# Patient Record
Sex: FEMALE | Race: OTHER | HISPANIC OR LATINO | Employment: FULL TIME | URBAN - METROPOLITAN AREA
[De-identification: names, ages, dates, MRNs, and addresses within clinical notes are randomized per-mention and may not be internally consistent; named-entity substitution may affect disease eponyms.]

---

## 2017-01-25 RX ORDER — LEVOTHYROXINE SODIUM 0.05 MG/1
TABLET ORAL
Qty: 90 TAB | Refills: 1 | Status: SHIPPED | OUTPATIENT
Start: 2017-01-25 | End: 2017-10-13

## 2017-06-26 ENCOUNTER — OFFICE VISIT (OUTPATIENT)
Dept: MEDICAL GROUP | Facility: MEDICAL CENTER | Age: 42
End: 2017-06-26
Payer: COMMERCIAL

## 2017-06-26 VITALS
SYSTOLIC BLOOD PRESSURE: 96 MMHG | TEMPERATURE: 99.2 F | OXYGEN SATURATION: 96 % | HEART RATE: 72 BPM | DIASTOLIC BLOOD PRESSURE: 66 MMHG | WEIGHT: 140.7 LBS | BODY MASS INDEX: 24.93 KG/M2 | HEIGHT: 63 IN

## 2017-06-26 DIAGNOSIS — M26.629 TMJ PAIN DYSFUNCTION SYNDROME: ICD-10-CM

## 2017-06-26 DIAGNOSIS — Z12.31 ENCOUNTER FOR SCREENING MAMMOGRAM FOR MALIGNANT NEOPLASM OF BREAST: ICD-10-CM

## 2017-06-26 PROCEDURE — 99214 OFFICE O/P EST MOD 30 MIN: CPT | Performed by: FAMILY MEDICINE

## 2017-06-26 ASSESSMENT — PATIENT HEALTH QUESTIONNAIRE - PHQ9: CLINICAL INTERPRETATION OF PHQ2 SCORE: 0

## 2017-06-26 NOTE — PROGRESS NOTES
Subjective:     Chief Complaint   Patient presents with   • Facial Swelling     (L) side   • Ear Pain     (L) ear       History of Present Illness:  Koki Arguello is a 41 y.o. female established patient who presents today to have evaluation of left-sided facial pain:    TMJ pain dysfunction syndrome  Patient presents for one to 2 week history of left-sided facial pain and ear pain. Patient states pain originates in the left V1 distribution, and radiates down towards the left V3 distribution. Patient acknowledges grinding her teeth at night, does not use a .    Patient has recently seen her dentist, approximately one week ago, who did not find any cavities or other dental pathologies at that time.    ROS is NEGATIVE for confusion, altered mentation, word finding difficulty, memory loss, facial droop, dysarthria, dysphagia, hemiplegia, gait instability.      Patient Active Problem List    Diagnosis Date Noted   • TMJ pain dysfunction syndrome 06/26/2017   • GERD (gastroesophageal reflux disease) 01/13/2016   • Allergic rhinitis 02/06/2013   • Tinnitus 11/08/2010   • Proteinuria 03/24/2010   • Hypothyroidism 03/19/2010   • Preventative health care 02/19/2010   • Scoliosis    • Family history of breast cancer in mother 01/06/2010       Additional History:   Allergies:    Nkda     Current Medications:     Current Outpatient Prescriptions   Medication Sig Dispense Refill   • levothyroxine (SYNTHROID) 50 MCG Tab TAKE ONE TABLET BY MOUTH EVERY MORNING ON AN EMPTY STOMACH 90 Tab 1   • levothyroxine (SYNTHROID) 50 MCG Tab Take 1 Tab by mouth Every morning on an empty stomach. 30 Tab 3   • levothyroxine (SYNTHROID) 25 MCG Tab Take 1 Tab by mouth every day. 90 Tab 1   • Multiple Vitamins-Minerals (MULTIVITAL) TABS Take 1 Tab by mouth every day.     • Cholecalciferol (VITAMIN D) 1000 UNIT CAPS Take 2 Caps by mouth every day.       No current facility-administered medications for this visit.        Social  "History:     Social History   Substance Use Topics   • Smoking status: Never Smoker    • Smokeless tobacco: Never Used   • Alcohol Use: 0.0 oz/week     0 Standard drinks or equivalent per week      Comment: occasional       ROS:     - NOTE: All other systems reviewed and are negative, except as in HPI.     Objective:   Physical Exam:    Vitals: Blood pressure 96/66, pulse 72, temperature 37.3 °C (99.2 °F), height 1.6 m (5' 2.99\"), weight 63.821 kg (140 lb 11.2 oz), SpO2 96 %, not currently breastfeeding.   BMI: Body mass index is 24.93 kg/(m^2).   General/Constitutional: Vitals as above, Well nourished, well developed female in no acute distress   Head/Eyes: Head is grossly normal & atraumatic, bilateral conjunctivae not injected, bilateral EOMI, bilateral PERRL   ENT: Bilateral external ears grossly normal in appearance, Hearing grossly intact, bilateral external canals without cerumen impaction, bilateral TMs are able to the visualized without erythema/bulging/exudates, External nares normal in appearance and without discharge/bleeding, bilateral frontal/maxillary sinuses non tender to palpation, posterior oropharynx without erythema/exudates and without airway obstruction, left TMJ pain upon opening and closing with direct pressure or with opening and closing with tympanoscope present in left external canal   Respiratory: No respiratory distress, bilateral lungs are clear to ausculation in all lung fields (anterior/lateral/posterior), no wheezing/rhonchi/rales   Cardiovascular: Regular rate and rhythm without murmur/gallops/rubs, distal pulses are intact and equal bilaterally (radial, posterior tibial), no bilateral lower extremity edema   MSK: Gait grossly normal & not antalgic   Integumentary: No apparent rashes   Psych: Judgment grossly appropriate, no apparent depression/anxiety    Assessment and Plan:   1. TMJ pain dysfunction syndrome  Unstable, controlled. Patient instructed to use  as well as " take NSAIDs. Patient verbalizes understanding.    2. Encounter for screening mammogram for malignant neoplasm of breast  Patient due for mammogram due to [facility] of breast pathology (mother). Ordered as below.    - MA-SCREEN MAMMO W/CAD-BILAT      RTC: Patient to follow-up with PCP for annual medical exam.    PLEASE NOTE: This dictation was created using voice recognition software. I have made every reasonable attempt to correct obvious errors, but I expect that there are errors of grammar and possibly content that I did not discover before finalizing the note.

## 2017-06-26 NOTE — PATIENT INSTRUCTIONS
For the next 2 weeks, do the followin) Wear a bite guard at night (can buy a new one at a [employer] store)     2) Use Ibuprofen 400 or 600mg up to every 6 hours as needed.  At least take this at night, and first thing in the morning.

## 2017-06-26 NOTE — ASSESSMENT & PLAN NOTE
Patient presents for one to 2 week history of left-sided facial pain and ear pain. Patient states pain originates in the left V1 distribution, and radiates down towards the left V3 distribution. Patient acknowledges grinding her teeth at night, does not use a .    Patient has recently seen her dentist, approximately one week ago, who did not find any cavities or other dental pathologies at that time.    ROS is NEGATIVE for confusion, altered mentation, word finding difficulty, memory loss, facial droop, dysarthria, dysphagia, hemiplegia, gait instability.

## 2017-06-26 NOTE — MR AVS SNAPSHOT
"Koki Arguello   2017 10:40 AM   Office Visit   MRN: [id number]    Department:  [place] [address]   Dept Phone:  [phone]    Description:  Female : 1975   Provider:  Hal Leyva M.D.           Reason for Visit     Facial Swelling (L) side    Ear Pain (L) ear      Allergies as of 2017     Allergen Noted Reactions    Nkda [No Known Drug Allergy] 2010         You were diagnosed with     TMJ pain dysfunction syndrome   [156468]       Encounter for screening mammogram for malignant neoplasm of breast   [[id number]]         Vital Signs     Blood Pressure Pulse Temperature Height Weight Body Mass Index    96/66 mmHg 72 37.3 °C (99.2 °F) 1.6 m (5' 2.99\") 63.821 kg (140 lb 11.2 oz) 24.93 kg/m2    Oxygen Saturation Breastfeeding? Smoking Status             96% No Never Smoker          Basic Information     Date Of Birth Sex Race Ethnicity Preferred Language    1975 Female White Non- English      Problem List              ICD-10-CM Priority Class Noted - Resolved    Family history of breast cancer in mother (Chronic) Z80.3   2010 - Present    Preventative health care Z00.00   2010 - Present    Scoliosis M41.9   Unknown - Present    Hypothyroidism E03.9   3/19/2010 - Present    Proteinuria R80.9   3/24/2010 - Present    Tinnitus    2010 - Present    Allergic rhinitis    2013 - Present    GERD (gastroesophageal reflux disease) K21.9   2016 - Present    TMJ pain dysfunction syndrome M26.629   2017 - Present      Health Maintenance        Date Due Completion Dates    IMM DTaP/Tdap/Td Vaccine (1 - Tdap) 1993 ---    PAP SMEAR 2016, 2011, 2010    MAMMOGRAM 2017, 4/15/2014, 2013, 2011, 3/2/2010, 3/2/2010, 2009, 2009, 3/14/2007, 3/14/2007            Current Immunizations     Hepatitis B Vaccine Recombivax (Adol/Adult) 2015  4:57 PM    Influenza TIV (IM) 2013, 2013   " Influenza Vaccine Quad Inj (Pf) 11/5/2016  8:59 AM, 11/7/2015  9:49 AM    Tuberculin Skin Test 10/20/2015  4:27 PM      Below and/or attached are the medications your provider expects you to take. Review all of your home medications and newly ordered medications with your provider and/or pharmacist. Follow medication instructions as directed by your provider and/or pharmacist. Please keep your medication list with you and share with your provider. Update the information when medications are discontinued, doses are changed, or new medications (including over-the-counter products) are added; and carry medication information at all times in the event of emergency situations     Allergies:  NKDA - (reactions not documented)               Medications  Valid as of: June 26, 2017 - 11:06 AM    Generic Name Brand Name Tablet Size Instructions for use    Cholecalciferol (Cap) Vitamin D 1000 UNIT Take 2 Caps by mouth every day.        Levothyroxine Sodium (Tab) SYNTHROID 25 MCG Take 1 Tab by mouth every day.        Levothyroxine Sodium (Tab) SYNTHROID 50 MCG Take 1 Tab by mouth Every morning on an empty stomach.        Levothyroxine Sodium (Tab) SYNTHROID 50 MCG TAKE ONE TABLET BY MOUTH EVERY MORNING ON AN EMPTY STOMACH        Multiple Vitamins-Minerals (Tab) MULTIVITAL  Take 1 Tab by mouth every day.        .                 Medicines prescribed today were sent to:     [facility][id number] - [place] - [address]    [address] [address]    Phone: [phone] Fax: [phone]    Open 24 Hours?: No    [id number] - [place] - [address]    [address] [facility] [address]    Phone: [phone] Fax: [phone]    Open 24 Hours?: No      Medication refill instructions:       If your prescription bottle indicates you have medication refills left, it is not necessary to call your provider’s office. Please contact your pharmacy and they will refill your medication.      If your prescription bottle indicates you do not have any refills left, you may request refills at any time through one of the following ways: The online [employer] system (except Urgent Care), by calling your provider’s office, or by asking your pharmacy to contact your provider’s office with a refill request. Medication refills are processed only during regular business hours and may not be available until the next business day. Your provider may request additional information or to have a follow-up visit with you prior to refilling your medication.   *Please Note: Medication refills are assigned a new Rx number when refilled electronically. Your pharmacy may indicate that no refills were authorized even though a new prescription for the same medication is available at the pharmacy. Please request the medicine by name with the pharmacy before contacting your provider for a refill.        Instructions    For the next 2 weeks, do the followin) Wear a bite guard at night (can buy a new one at a sports' store)     2) Use Ibuprofen 400 or 600mg up to every 6 hours as needed.  At least take this at night, and first thing in the morning.              [employer] Access Code: Activation code not generated  Current [employer] Status: Active

## 2017-09-26 ENCOUNTER — HOSPITAL ENCOUNTER (OUTPATIENT)
Facility: MEDICAL CENTER | Age: 42
End: 2017-09-26
Payer: COMMERCIAL

## 2017-09-27 ENCOUNTER — APPOINTMENT (OUTPATIENT)
Dept: RADIOLOGY | Facility: MEDICAL CENTER | Age: 42
End: 2017-09-27
Attending: FAMILY MEDICINE
Payer: COMMERCIAL

## 2017-09-27 LAB
ALBUMIN SERPL BCP-MCNC: 4.4 G/DL (ref 3.2–4.9)
ALBUMIN/GLOB SERPL: 1.3 G/DL
ALP SERPL-CCNC: 57 U/L (ref 30–99)
ALT SERPL-CCNC: 16 U/L (ref 2–50)
ANION GAP SERPL CALC-SCNC: 9 MMOL/L (ref 0–11.9)
AST SERPL-CCNC: 19 U/L (ref 12–45)
BDY FAT % MEASURED: 33.5 %
BILIRUB SERPL-MCNC: 0.9 MG/DL (ref 0.1–1.5)
BP DIAS: 64 MMHG
BP SYS: 98 MMHG
BUN SERPL-MCNC: 10 MG/DL (ref 8–22)
CALCIUM SERPL-MCNC: 9.6 MG/DL (ref 8.5–10.5)
CHLORIDE SERPL-SCNC: 105 MMOL/L (ref 96–112)
CHOLEST SERPL-MCNC: 175 MG/DL (ref 100–199)
CO2 SERPL-SCNC: 24 MMOL/L (ref 20–33)
CREAT SERPL-MCNC: 0.78 MG/DL (ref 0.5–1.4)
DIABETES HTDIA: NO
EVENT NAME HTEVT: NORMAL
GFR SERPL CREATININE-BSD FRML MDRD: >60 ML/MIN/1.73 M 2
GLOBULIN SER CALC-MCNC: 3.4 G/DL (ref 1.9–3.5)
GLUCOSE SERPL-MCNC: 83 MG/DL (ref 65–99)
HDLC SERPL-MCNC: 77 MG/DL
HYPERTENSION HTHYP: NO
LDLC SERPL CALC-MCNC: 78 MG/DL
POTASSIUM SERPL-SCNC: 4 MMOL/L (ref 3.6–5.5)
PROT SERPL-MCNC: 7.8 G/DL (ref 6–8.2)
SCREENING LOC CITY HTCIT: NORMAL
SCREENING LOC STATE HTSTA: NORMAL
SCREENING LOCATION HTLOC: NORMAL
SODIUM SERPL-SCNC: 138 MMOL/L (ref 135–145)
SUBSCRIBER ID HTSID: NORMAL
TRIGL SERPL-MCNC: 100 MG/DL (ref 0–149)

## 2017-10-04 ENCOUNTER — OFFICE VISIT (OUTPATIENT)
Dept: MEDICAL GROUP | Facility: MEDICAL CENTER | Age: 42
End: 2017-10-04
Payer: COMMERCIAL

## 2017-10-04 VITALS
RESPIRATION RATE: 16 BRPM | HEART RATE: 72 BPM | OXYGEN SATURATION: 99 % | WEIGHT: 138 LBS | BODY MASS INDEX: 24.45 KG/M2 | HEIGHT: 63 IN | DIASTOLIC BLOOD PRESSURE: 70 MMHG | SYSTOLIC BLOOD PRESSURE: 108 MMHG | TEMPERATURE: 98.2 F

## 2017-10-04 DIAGNOSIS — J01.00 ACUTE NON-RECURRENT MAXILLARY SINUSITIS: ICD-10-CM

## 2017-10-04 PROBLEM — J06.9 VIRAL UPPER RESPIRATORY TRACT INFECTION: Status: ACTIVE | Noted: 2017-10-04

## 2017-10-04 PROCEDURE — 99214 OFFICE O/P EST MOD 30 MIN: CPT | Performed by: FAMILY MEDICINE

## 2017-10-04 RX ORDER — AMOXICILLIN 500 MG/1
1000 CAPSULE ORAL 2 TIMES DAILY
Qty: 40 CAP | Refills: 0 | Status: SHIPPED | OUTPATIENT
Start: 2017-10-04 | End: 2017-10-13

## 2017-10-04 NOTE — PROGRESS NOTES
Subjective:     Chief Complaint   Patient presents with   • Neck Pain     right side   • Headache     pain radiated from left temple to jaw       Koki Arguello is a 41 y.o. female here today for evaluation and management of:    Acute non-recurrent maxillary sinusitis  Illness: 5 days of illness including: nasal congestion, clear/whitish rhinorrhea, ear pain/congestion, facial pain, bilateral, cough, myalgias ,  Sinus pain and pressure: left maxillary  Symptoms negative for sore throat,   Treatments tried: Advil   Since onset, symptoms got better but then developed another fever and worse ear pain and left sinus pain  Similarly ill exposures: yes  Medical history negative for asthma  She  reports that she has never smoked. She has never used smokeless tobacco.         Allergies   Allergen Reactions   • Nkda [No Known Drug Allergy]        Current medicines (including changes today)  Current Outpatient Prescriptions   Medication Sig Dispense Refill   • amoxicillin (AMOXIL) 500 MG Cap Take 2 Caps by mouth 2 times a day. 40 Cap 0   • levothyroxine (SYNTHROID) 50 MCG Tab Take 1 Tab by mouth Every morning on an empty stomach. 30 Tab 3   • Multiple Vitamins-Minerals (MULTIVITAL) TABS Take 1 Tab by mouth every day.     • Cholecalciferol (VITAMIN D) 1000 UNIT CAPS Take 2 Caps by mouth every day.     • levothyroxine (SYNTHROID) 50 MCG Tab TAKE ONE TABLET BY MOUTH EVERY MORNING ON AN EMPTY STOMACH 90 Tab 1   • levothyroxine (SYNTHROID) 25 MCG Tab Take 1 Tab by mouth every day. 90 Tab 1     No current facility-administered medications for this visit.        She  has a past medical history of Allergy; Family history of breast cancer in mother; GERD (gastroesophageal reflux disease); Hypothyroidism; Proteinuria; Scoliosis; and Tinnitus.    Patient Active Problem List    Diagnosis Date Noted   • Acute non-recurrent maxillary sinusitis 10/04/2017   • TMJ pain dysfunction syndrome 06/26/2017   • GERD (gastroesophageal reflux  "disease) 01/13/2016   • Allergic rhinitis 02/06/2013   • Tinnitus 11/08/2010   • Proteinuria 03/24/2010   • Hypothyroidism 03/19/2010   • Preventative health care 02/19/2010   • Scoliosis    • Family history of breast cancer in mother 01/06/2010       ROS .    No nausea or vomiting.  No chest pain or palpitations.  No SOB.  No pain with urination or hematuria.  No black or bloody stools.       Objective:     Blood pressure 108/70, pulse 72, temperature 36.8 °C (98.2 °F), resp. rate 16, height 1.6 m (5' 3\"), weight 62.6 kg (138 lb), SpO2 99 %. Body mass index is 24.45 kg/m².   Physical Exam:  Well developed, well nourished.  Alert, oriented in no acute distress.  Eye contact is good, speech goal directed, affect calm  Eyes: conjunctiva non-injected, sclera non-icteric.  Ears: Pinna normal. TM With clear fluid bilaterally  Nose: Nares are patent. Erythematous, swollen mucosa with yellow discharge  Mouth: Oral mucous membranes pink and moist with no lesions. Mild diffuse posterior pharynx erythema with yellow postnasal drainage  Sinuses tender to percussion bilateral frontal and left maxillary  Neck Supple.  No adenopathy or masses in the neck or supraclavicular regions. No thyromegaly  Lungs: clear to auscultation bilaterally with good excursion. No wheezes or rhonchi  CV: regular rate and rhythm. No murmur        Assessment and Plan:   The following treatment plan was discussed    1. Acute non-recurrent maxillary sinusitis  Discussed with patient that this probably started as a viral infection and she now has secondary bacterial infection. We'll start her on antibiotics amoxicillin for 10 days. Increase fluids and rest. Call if not improving.  - amoxicillin (AMOXIL) 500 MG Cap; Take 2 Caps by mouth 2 times a day.  Dispense: 40 Cap; Refill: 0    Any change or worsening of signs or symptoms, patient encouraged to follow-up or report to the emergency room for further evaluation. Patient understands and " agrees.    Followup: Return if symptoms worsen or fail to improve.

## 2017-10-04 NOTE — PATIENT INSTRUCTIONS

## 2017-10-04 NOTE — ASSESSMENT & PLAN NOTE
Illness: 5 days of illness including: nasal congestion, clear/whitish rhinorrhea, ear pain/congestion, facial pain, bilateral, cough, myalgias ,  Sinus pain and pressure: left maxillary  Symptoms negative for sore throat,   Treatments tried: Advil   Since onset, symptoms got better but then developed another fever and worse ear pain and left sinus pain  Similarly ill exposures: yes  Medical history negative for asthma  She  reports that she has never smoked. She has never used smokeless tobacco.

## 2017-10-13 ENCOUNTER — OFFICE VISIT (OUTPATIENT)
Dept: MEDICAL GROUP | Facility: MEDICAL CENTER | Age: 42
End: 2017-10-13
Payer: COMMERCIAL

## 2017-10-13 VITALS
WEIGHT: 139.2 LBS | HEART RATE: 76 BPM | BODY MASS INDEX: 24.66 KG/M2 | TEMPERATURE: 97.4 F | RESPIRATION RATE: 16 BRPM | OXYGEN SATURATION: 100 % | SYSTOLIC BLOOD PRESSURE: 106 MMHG | DIASTOLIC BLOOD PRESSURE: 70 MMHG | HEIGHT: 63 IN

## 2017-10-13 DIAGNOSIS — G50.0 TRIGEMINAL NEURALGIA OF LEFT SIDE OF FACE: ICD-10-CM

## 2017-10-13 PROBLEM — R68.84 JAW PAIN: Status: ACTIVE | Noted: 2017-10-13

## 2017-10-13 PROBLEM — J01.00 ACUTE NON-RECURRENT MAXILLARY SINUSITIS: Status: RESOLVED | Noted: 2017-10-04 | Resolved: 2017-10-13

## 2017-10-13 PROCEDURE — 99214 OFFICE O/P EST MOD 30 MIN: CPT | Performed by: PHYSICIAN ASSISTANT

## 2017-10-13 RX ORDER — BACLOFEN 20 MG/1
20 TABLET ORAL 3 TIMES DAILY
Qty: 60 TAB | Refills: 0 | Status: SHIPPED | OUTPATIENT
Start: 2017-10-13 | End: 2020-07-23

## 2017-10-13 NOTE — PROGRESS NOTES
Subjective:   Koki Arguello is a 41 y.o. female here today for shooting pain down the left side of her face for 2 weeks.    Trigeminal neuralgia of left side of face  This is a 41-year-old female complains of a two-week history of pain that starts in her left temporal area radiating down through the jaw in the lower aspect and around to the front other lower jaw over teeth. She states over the two-week symptoms got worse. Pain is like an electrical shock like lightening to last for a couple seconds. She has seen a PCP down stairs also went to dentistry. Initially dentistry did x-rays did not believe that there were any cause for concern because x-rays were negative. He did not believe it was TMJ. PCP down stairs provided an antibiotic which has not provided any effectiveness. Back in June she had a diagnosis of TMJ by Dr. Leyva. At that time the pain was a lot different. Pain was more in the left upper jaw area. She's had TMJ in the past but this is different. She wears a . She been taking ibuprofen regularly without any effectiveness. She states that the other night her  gave her a muscle relaxer, doesn't know which one, and it helped her sleep at night. She states she's been taking the antibiotic but it has been causing side effects and she wants to stop it if she could.       Current medicines (including changes today)  Current Outpatient Prescriptions   Medication Sig Dispense Refill   • baclofen (LIORESAL) 20 MG tablet Take 1 Tab by mouth 3 times a day. 60 Tab 0   • levothyroxine (SYNTHROID) 50 MCG Tab Take 1 Tab by mouth Every morning on an empty stomach. 30 Tab 3   • levothyroxine (SYNTHROID) 25 MCG Tab Take 1 Tab by mouth every day. 90 Tab 1   • Multiple Vitamins-Minerals (MULTIVITAL) TABS Take 1 Tab by mouth every day.     • Cholecalciferol (VITAMIN D) 1000 UNIT CAPS Take 2 Caps by mouth every day.       No current facility-administered medications for this visit.      She  has a past  "medical history of Allergy; Family history of breast cancer in mother; GERD (gastroesophageal reflux disease); Hypothyroidism; Proteinuria; Scoliosis; and Tinnitus.    ROS   No chest pain, no shortness of breath, no abdominal pain and all other systems were reviewed and are negative.       Objective:     Blood pressure 106/70, pulse 76, temperature 36.3 °C (97.4 °F), resp. rate 16, height 1.6 m (5' 3\"), weight 63.1 kg (139 lb 3.2 oz), SpO2 100 %. Body mass index is 24.66 kg/m².   Physical Exam:  Constitutional: Alert, no distress.  Skin: Warm, dry, good turgor, no rashes in visible areas.  Eye: Equal, round and reactive, conjunctiva clear, lids normal.  ENMT: Lips without lesions, good dentition without significant wearing of her teeth, oropharynx clear.TMs bilaterally are clear.  Neck: Trachea midline, no masses.   Lymph: No cervical or supraclavicular lymphadenopathy  Respiratory: Unlabored respiratory effort, lungs clear to auscultation, no wheezes, no ronchi.  Cardiovascular: Normal S1, S2, no murmur, no edema.  Neuro: CN II through XII intact.  Musculoskeletal: Point tenderness over left TMJ.  Psych: Alert and oriented x3, normal affect and mood.        Assessment and Plan:   The following treatment plan was discussed    1. Trigeminal neuralgia of left side of face  Acute, new onset condition. Symptoms began around the left temple radiating down the face. Suspicious for trigeminal neuralgia and not TMJ although TMJ factors may complicate picture. Referred to neurology. Also provided baclofen as it may be effective may take 20 or 10 mg 3 times a day depending upon it causes drowsiness. May stop antibiotic. Contact me or return with any continuation of worsening symptoms. Will defer any imaging to neurology.  - baclofen (LIORESAL) 20 MG tablet; Take 1 Tab by mouth 3 times a day.  Dispense: 60 Tab; Refill: 0  - REFERRAL TO NEUROLOGY      Followup: Return if symptoms worsen or fail to improve.    Please note that " this dictation was created using voice recognition software. I have made every reasonable attempt to correct obvious errors, but I expect that there are errors of grammar and possibly content that I did not discover before finalizing the note.

## 2017-10-13 NOTE — ASSESSMENT & PLAN NOTE
This is a 41-year-old female complains of a two-week history of pain that starts in her left temporal area radiating down through the jaw in the lower aspect and around to the front other lower jaw over teeth. She states over the two-week symptoms got worse. Pain is like an electrical shock like lightening to last for a couple seconds. She has seen a PCP down stairs also went to dentistry. Initially dentistry did x-rays did not believe that there were any cause for concern because x-rays were negative. He did not believe it was TMJ. PCP down stairs provided an antibiotic which has not provided any effectiveness. Back in June she had a diagnosis of TMJ by Dr. Leyva. At that time the pain was a lot different. Pain was more in the left upper jaw area. She's had TMJ in the past but this is different. She wears a . She been taking ibuprofen regularly without any effectiveness. She states that the other night her  gave her a muscle relaxer, doesn't know which one, and it helped her sleep at night. She states she's been taking the antibiotic but it has been causing side effects and she wants to stop it if she could.

## 2017-10-25 ENCOUNTER — HOSPITAL ENCOUNTER (OUTPATIENT)
Dept: RADIOLOGY | Facility: MEDICAL CENTER | Age: 42
End: 2017-10-25
Attending: FAMILY MEDICINE
Payer: COMMERCIAL

## 2017-10-25 ENCOUNTER — HOSPITAL ENCOUNTER (OUTPATIENT)
Facility: MEDICAL CENTER | Age: 42
End: 2017-10-25
Attending: NURSE PRACTITIONER
Payer: COMMERCIAL

## 2017-10-25 ENCOUNTER — OFFICE VISIT (OUTPATIENT)
Dept: MEDICAL GROUP | Facility: MEDICAL CENTER | Age: 42
End: 2017-10-25
Payer: COMMERCIAL

## 2017-10-25 VITALS
SYSTOLIC BLOOD PRESSURE: 118 MMHG | RESPIRATION RATE: 16 BRPM | OXYGEN SATURATION: 98 % | BODY MASS INDEX: 24.59 KG/M2 | HEIGHT: 63 IN | WEIGHT: 138.8 LBS | HEART RATE: 82 BPM | DIASTOLIC BLOOD PRESSURE: 60 MMHG | TEMPERATURE: 98.4 F

## 2017-10-25 DIAGNOSIS — Z12.31 ENCOUNTER FOR SCREENING MAMMOGRAM FOR MALIGNANT NEOPLASM OF BREAST: ICD-10-CM

## 2017-10-25 DIAGNOSIS — G50.0 TRIGEMINAL NEURALGIA: ICD-10-CM

## 2017-10-25 DIAGNOSIS — Z12.4 ROUTINE CERVICAL SMEAR: ICD-10-CM

## 2017-10-25 DIAGNOSIS — N63.0 BREAST NODULE: ICD-10-CM

## 2017-10-25 DIAGNOSIS — Z01.419 PAP SMEAR, LOW-RISK: ICD-10-CM

## 2017-10-25 DIAGNOSIS — E03.9 ACQUIRED HYPOTHYROIDISM: ICD-10-CM

## 2017-10-25 DIAGNOSIS — E55.9 VITAMIN D DEFICIENCY: ICD-10-CM

## 2017-10-25 PROCEDURE — 87624 HPV HI-RISK TYP POOLED RSLT: CPT

## 2017-10-25 PROCEDURE — G0202 SCR MAMMO BI INCL CAD: HCPCS

## 2017-10-25 PROCEDURE — 88175 CYTOPATH C/V AUTO FLUID REDO: CPT

## 2017-10-25 PROCEDURE — 99396 PREV VISIT EST AGE 40-64: CPT | Performed by: NURSE PRACTITIONER

## 2017-10-25 RX ORDER — LEVOTHYROXINE SODIUM 0.05 MG/1
50 TABLET ORAL
Qty: 90 TAB | Refills: 0 | Status: SHIPPED | OUTPATIENT
Start: 2017-10-25 | End: 2018-02-11 | Stop reason: SDUPTHER

## 2017-10-25 NOTE — PROGRESS NOTES
Subjective:      Koki Arguello is a 41 y.o. female who presents with Gynecologic Exam            HPI  Seen in f/u for pap smear.  She has been seen recently for left severe facial pain.  She was dx initially with sinus infection.  Then saw someone else for TMJ pain.  Then saw a 3 rd provider and dx with trigiminal neuralgia.  She also went to a dentist.  All was ok.  She has taken antibx but had s/e.  She completed it anyway.  She is now only occas using muscle relaxer.  The pain is getting better.  She also has a appt with neuro for eval.    She thinks that she had a previous episode in june but the pain was less.  She is now having left top of head pain episodes that will last up to 10 min.  No  Temperature sensation.   She has her lab done for biometrics.  CMP, GFR, LP is wnl.  Reviewed all withpt.   She has done research and learned that sometimes trigiminal neuralgia is d/t low d or b12.  Her d has been low last year.  She is on otc d.    She is on synthroid.  She is due updated lab.  Stable on meds.      Patient Active Problem List    Diagnosis Date Noted   • Trigeminal neuralgia of left side of face 10/13/2017   • TMJ pain dysfunction syndrome 06/26/2017   • GERD (gastroesophageal reflux disease) 01/13/2016   • Allergic rhinitis 02/06/2013   • Tinnitus 11/08/2010   • Proteinuria 03/24/2010   • Hypothyroidism 03/19/2010   • Preventative health care 02/19/2010   • Scoliosis    • Family history of breast cancer in mother 01/06/2010     Current Outpatient Prescriptions   Medication Sig Dispense Refill   • baclofen (LIORESAL) 20 MG tablet Take 1 Tab by mouth 3 times a day. 60 Tab 0   • levothyroxine (SYNTHROID) 50 MCG Tab Take 1 Tab by mouth Every morning on an empty stomach. 30 Tab 3   • Multiple Vitamins-Minerals (MULTIVITAL) TABS Take 1 Tab by mouth every day.     • Cholecalciferol (VITAMIN D) 1000 UNIT CAPS Take 2 Caps by mouth every day.       No current facility-administered medications for this visit.   "    Allergies   Allergen Reactions   • Amoxicillin Itching and Unspecified     Tight bumpy lips        ROS    Review of Systems   Constitutional: Negative.  Negative for fever, weight loss, malaise/fatigue and diaphoresis.   HENT: Negative.  Negative for hearing loss, ear pain, nosebleeds, congestion, sore throat, neck pain, tinnitus and ear discharge.    Eyes: Negative.  Negative for blurred vision, double vision, photophobia, pain, discharge and redness.   Respiratory: Negative.  Negative for cough, hemoptysis, sputum production, shortness of breath, wheezing and stridor.    Cardiovascular: Negative.  Negative for chest pain, palpitations, orthopnea, claudication, leg swelling and PND.   Gastrointestinal: Negative.  Negative for heartburn, nausea, vomiting, abdominal pain, diarrhea, blood in stool and melena.  mild constipation.    Genitourinary: Negative.  Negative for dysuria, urgency, frequency, incontinence, hematuria and flank pain.   Musculoskeletal: Negative.  Negative for back pain, joint pain and falls.   Skin: Negative.  Negative for itching and rash.   Neurological: Negative.  Negative for dizziness, tingling, tremors, sensory change, speech change, focal weakness, seizures, loss of consciousness, weakness and headaches.   Endo/Heme/Allergies: Negative.  Negative for environmental allergies and polydipsia. Does not bruise/bleed easily.   Psychiatric/Behavioral: Negative.  Negative for depression, suicidal ideas, hallucinations, memory loss and substance abuse. The patient is not nervous/anxious and does not have insomnia.    All other systems reviewed and are negative.         Objective:     /60   Pulse 82   Temp 36.9 °C (98.4 °F)   Resp 16   Ht 1.6 m (5' 3\")   Wt 63 kg (138 lb 12.8 oz)   SpO2 98%   BMI 24.59 kg/m²      Physical Exam  Physical Exam   Vitals reviewed.  Constitutional: oriented to person, place, and time. appears well-developed and well-nourished. No distress.   HENT: Head: " Normocephalic and atraumatic. Bilateral tympanic membranes wnl w/o bulging.  Right Ear: External ear normal. Left Ear: External ear normal. Nose: Nose normal.  Mouth/Throat: Oropharynx is clear and moist. No oropharyngeal exudate. emelina tm wnl. Eyes: Conjunctivae and EOM are normal. Pupils are equal, round, and reactive to light. Right eye exhibits no discharge. Left eye exhibits no discharge. No scleral icterus.  she has tenderenss in left face, temple extending down to lateral left neck.    Neck: Normal range of motion. Neck supple. No JVD present.   Cardiovascular: Normal rate, regular rhythm, normal heart sounds and intact distal pulses.  Exam reveals no gallop and no friction rub.  No murmur heard.  No carotid bruits   Pulmonary/Chest: Effort normal and breath sounds normal. No stridor. No respiratory distress. no wheezes or rales. exhibits no tenderness.   Abdominal: Soft. Bowel sounds are normal. exhibits no distension and no mass. No tenderness. no rebound and no guarding.   Musculoskeletal: Normal range of motion. exhibits no edema or tenderness.  emelina pedal pulses 2+.  Lymphadenopathy:  no cervical or supraclavicular adenopathy.   Neurological: alert and oriented to person, place, and time. has normal reflexes. displays normal reflexes. No cranial nerve deficit. exhibits normal muscle tone. Coordination normal.   Skin: Skin is warm and dry. No rash noted. no diaphoresis. No erythema. No pallor.   Psychiatric: normal mood and affect. behavior is normal.   SUBJECTIVE: 41 y.o. female for annual routine pap and checkup.  Gyn History:   Last Pap: 2013  Contraception: vasectomy  H/O Abnormal Pap long ago  H/O STI none  Current partner: monogamous  Lmp:  10/6/17  ROS:  Menses every month with no excessive cramping or bleeding.  No analgesics required during menses.  No pelvic pain, vaginal discharge or dyspareunia.  No breast tenderness, mass, nipple discharge, changes in size or contour, or abnormal cyclic  discomfort.   Breast Exam:Performed with instruction during examination. Breasts equal size and shape.  No axillary lymphadenopathy, no skin changes, left breast 1200 firm area of tissue nontender and movable.  Rt breast at 1100 with area of firm tissues w/o tenderness. No nipple retraction  Pelvic Exam - Sure Path Pap obtained and specimen sent to lab. Normal external genitalia with no lesions. Normal vaginal mucosa with normal rugation. Cervix has no visible lesions. No cervical motion tenderness. Uterus is normal sized with no masses. No adnexal tenderness or enlargement appreciated.                         Assessment/Plan:     1. Pap smear, low-risk  THINPREP PAP WITH HPV    f/u with pt with all test results.  then f/u yearly or sooner if lab is abn   2. Routine cervical smear  THINPREP PAP WITH HPV   3. 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  f/u with connie as sched.  check b12, folate as poss cause of sx.    4. Acquired hypothyroidism  levothyroxine (SYNTHROID) 50 MCG Tab    TSH    FREE THYROXINE    check tsh, t4.  refill synthroid   5. Vitamin D deficiency  VITAMIN D,25 HYDROXY    her last d was low.  recheck lab.  f/u with results.    6. Breast nodule      emelina breasts.  obtain mammo report and f/u with pt with results.

## 2017-10-26 ENCOUNTER — TELEPHONE (OUTPATIENT)
Dept: MEDICAL GROUP | Facility: MEDICAL CENTER | Age: 42
End: 2017-10-26

## 2017-10-26 DIAGNOSIS — R92.30 DENSE BREASTS: ICD-10-CM

## 2017-10-26 DIAGNOSIS — R92.2 DENSE BREASTS: ICD-10-CM

## 2017-10-26 LAB
CYTOLOGY REG CYTOL: NORMAL
HPV HR 12 DNA CVX QL NAA+PROBE: NEGATIVE
HPV16 DNA SPEC QL NAA+PROBE: NEGATIVE
HPV18 DNA SPEC QL NAA+PROBE: NEGATIVE
SPECIMEN SOURCE: NORMAL

## 2017-10-26 NOTE — TELEPHONE ENCOUNTER
Please let pt know that her mammo is wnl.  She does have dense breasts and some masses can be missed.  If she would like we can do a sonocine.  It is not covered by ins and usually costs abotu $125.

## 2017-11-08 ENCOUNTER — OFFICE VISIT (OUTPATIENT)
Dept: NEUROLOGY | Facility: MEDICAL CENTER | Age: 42
End: 2017-11-08
Payer: COMMERCIAL

## 2017-11-08 VITALS
WEIGHT: 141.1 LBS | SYSTOLIC BLOOD PRESSURE: 100 MMHG | OXYGEN SATURATION: 99 % | HEART RATE: 70 BPM | HEIGHT: 63 IN | RESPIRATION RATE: 16 BRPM | TEMPERATURE: 97.8 F | DIASTOLIC BLOOD PRESSURE: 60 MMHG | BODY MASS INDEX: 25 KG/M2

## 2017-11-08 DIAGNOSIS — R20.2 TINGLING IN EXTREMITIES: ICD-10-CM

## 2017-11-08 DIAGNOSIS — R51.9 FACIAL PAIN: ICD-10-CM

## 2017-11-08 DIAGNOSIS — E03.9 HYPOTHYROIDISM, UNSPECIFIED TYPE: ICD-10-CM

## 2017-11-08 PROCEDURE — 99204 OFFICE O/P NEW MOD 45 MIN: CPT | Performed by: PSYCHIATRY & NEUROLOGY

## 2017-11-08 RX ORDER — UBIDECARENONE 75 MG
400 CAPSULE ORAL DAILY
COMMUNITY
End: 2021-10-06

## 2017-11-09 NOTE — PATIENT INSTRUCTIONS
IMPRESSION:    1. Left facial pain since June 2017, left occipital burning since Oct 2017, and left limbs tingling for weeks  2. Hx of hypothyroidism, Benitez Palsy       PLAN/RECOMMENDATIONS:      Will offer MRI of brain and MRI of cervical to exclude multiple sclerosis  Will offer blood tests too    Not typical trigeminal neuralgia because the distribution of pain and decrease of pinprick sensation is more consistent with spinothalamic tract problem instead of one branch of trigeminal nerve-- alternatively, brain stem problem could also produce similar problem  The left limbs tingling also points to brain damae      SIGNATURE:  Medardo Garibay      CC:  RONALD Borden      ________________________________________________________________________      REFERENCE    Spinal Cord lesion could cause facial tingling -- due to involvement of spinal trigeminal system          ________________________________________________________________________    \

## 2017-11-09 NOTE — PROGRESS NOTES
NEUROLOGY NOTE    Referring Physician  RONALD Borden      CHIEF COMPLAINT:  Sharp pain affecting the left face since June 2017  Burning sensation over occipital since Oct 2017  Also numbness over the left limbs    Chief Complaint   Patient presents with   • Establish Care     Trigeminal neuralgia       PRESENT ILLNESS:   Sharp pain affecting the left face since June 2017  Burning sensation over occipital since Oct 2017  Also numbness over the left limbs  Neck was tight for weeks    The patient has hx of hypothyrodism    PAST MEDICAL HISTORY:  Past Medical History:   Diagnosis Date   • Allergy    • Family history of breast cancer in mother    • GERD (gastroesophageal reflux disease)    • Hypothyroidism    • Proteinuria     ? etiology.  renal us wnl   • Scoliosis    • Temporomandibular joint-pain-dysfunction syndrome (TMJ)    • Tinnitus    • Trigeminal neuralgia of left side of face        PAST SURGICAL HISTORY:  Past Surgical History:   Procedure Laterality Date   • GANGLION EXCISION  9/22/08    Performed by LYNETTE HUBER at SURGERY SAME DAY [facility] ORS   • GANGLION EXCISION      rt wrist       FAMILY HISTORY:  Family History   Problem Relation Age of Onset   • Cancer Mother      breast   • Heart Disease Father    • Diabetes Maternal Grandmother    • Hypertension Maternal Grandmother    • Diabetes Paternal Grandmother        SOCIAL HISTORY:  Social History     Social History   • Marital status:      Spouse name: N/A   • Number of children: N/A   • Years of education: N/A     Occupational History   • Not on file.     Social History Main Topics   • Smoking status: Never Smoker   • Smokeless tobacco: Never Used   • Alcohol use 0.0 oz/week      Comment: occasional   • Drug use: No   • Sexual activity: Not on file     Other Topics Concern   • Not on file     Social History Narrative   • No narrative on file     ALLERGIES:  Allergies   Allergen Reactions   • Amoxicillin Itching and Unspecified      "Tight bumpy lips     TOBHX  History   Smoking Status   • Never Smoker   Smokeless Tobacco   • Never Used     ALCHX  History   Alcohol Use   • 0.0 oz/week     Comment: occasional     DRUGHX  History   Drug Use No           MEDICATIONS:  Current Outpatient Prescriptions   Medication   • cyanocobalamin (VITAMIN B-12) 100 MCG Tab   • Probiotic Product (PROBIOTIC FORMULA) Cap   • levothyroxine (SYNTHROID) 50 MCG Tab   • Multiple Vitamins-Minerals (MULTIVITAL) TABS   • Cholecalciferol (VITAMIN D) 1000 UNIT CAPS   • baclofen (LIORESAL) 20 MG tablet     No current facility-administered medications for this visit.        REVIEW OF SYSTEM:    Constitutional: Denies fevers, Denies weight changes   Eyes: Denies changes in vision, no eye pain   Ears/Nose/Throat/Mouth: Denies nasal congestion or sore throat   Cardiovascular: Denies chest pain or palpitations   Respiratory: Denies SOB.   Gastrointestinal/Hepatic: Denies abdominal pain, nausea, vomiting, diarrhea, constipation or GI bleeding   Genitourinary: Denies bladder dysfunction, dysuria or frequency   Musculoskeletal/Rheum: Denies joint pain and swelling   Skin/Breast: Denies rash, denies breast lumps or discharge   Neurological: left facial pain  Psychiatric: denies mood disorder   Endocrine: hypothyrodism  Heme/Oncology/Lymph Nodes: Denies enlarged lymph nodes, denies brusing or known bleeding disorder   Allergic/Immunologic: Denies hx of allergies         PHYSICAL AND NEUROLOGICAL EXMAINATIONS:  VITAL SIGNS: /60   Pulse 70   Temp 36.6 °C (97.8 °F)   Resp 16   Ht 1.6 m (5' 3\")   Wt 64 kg (141 lb 1.6 oz)   SpO2 99%   BMI 24.99 kg/m²   CURRENT WEIGHT: [unfilled]  BMI: Body mass index is 24.99 kg/m².  PREVIOUS WEIGHTS:  Wt Readings from Last 25 Encounters:   11/08/17 64 kg (141 lb 1.6 oz)   10/25/17 63 kg (138 lb 12.8 oz)   10/13/17 63.1 kg (139 lb 3.2 oz)   10/04/17 62.6 kg (138 lb)   06/26/17 63.8 kg (140 lb 11.2 oz)   06/15/16 65.3 kg (144 lb)   02/29/16 65.8 kg " (145 lb)   01/13/16 65.8 kg (145 lb)   01/23/15 64.9 kg (143 lb)   05/14/14 62.6 kg (138 lb)   04/29/14 62.6 kg (138 lb)   03/27/14 62.1 kg (137 lb)   01/07/14 64.9 kg (143 lb)   02/06/13 62.1 kg (137 lb)   12/05/11 61.2 kg (135 lb)   09/30/11 60.8 kg (134 lb)   11/08/10 62.1 kg (137 lb)   06/04/10 60.3 kg (133 lb)   05/25/10 60.8 kg (134 lb)   03/24/10 59 kg (130 lb)   03/19/10 59.9 kg (132 lb)   06/24/09 58.5 kg (129 lb)   05/29/09 59.4 kg (131 lb)       General appearance of patient: WDWN(+) NAD(+)    EYES  o Fundus : Papilledem(-) Exudates(-) Hemorrhage(-)  Nervous System  Orientation to time, place and person(+)  Memory normal(+)  Language: aphasia(-)  Knowledge: past(+) Current(+)  Attention(+)  Cranial Nerves  • Nerve 2: intact  • Nerve 3,4,6: intact  • Nerve 5 :left facial pain and decreased pinprick   • Nerve 7: intact  • Nerve 8: intact  • Nerve 9 & 10: intact  • Nerve 11: intact  • Nerve 12: intact  Muscle Power and muscle tone: symmetric, normal in upper and lower  Sensory System: decreased pinprick over left face  Reflexes: symmetric throughout  Cerebellar Function FNP normal   Gait : Steady(+) TandemGait steady(+)  Heart and Vascular  Peripheral Vasucular system : Edema (-) Swelling(-)  RHB, Breathing sound clear  abdomen bowel sound normoactive  Extremities freely moveable  Joints no contracture       NEUROIMAGING: I reviewed the MRI/CT of brain       LAB:            IMPRESSION:    1. Left facial pain since June 2017, left occipital burning since Oct 2017, and left limbs tingling for weeks  2. Hx of hypothyroidism, Benitez Palsy       PLAN/RECOMMENDATIONS:      Will offer MRI of brain and MRI of cervical to exclude multiple sclerosis  Will offer blood tests too    Not typical trigeminal neuralgia because the distribution of pain and decrease of pinprick sensation is more consistent with spinothalamic tract problem instead of one branch of trigeminal nerve-- alternatively, brain stem problem could also  produce similar problem  The left limbs tingling also points to brain damae      SIGNATURE:  Medardo Garibay      CC:  RONALD Borden      ________________________________________________________________________      REFERENCE    Spinal Cord lesion could cause facial tingling -- due to involvement of spinal trigeminal system          ________________________________________________________________________    \

## 2017-11-13 ENCOUNTER — HOSPITAL ENCOUNTER (OUTPATIENT)
Dept: LAB | Facility: MEDICAL CENTER | Age: 42
End: 2017-11-13
Attending: NURSE PRACTITIONER
Payer: COMMERCIAL

## 2017-11-13 ENCOUNTER — HOSPITAL ENCOUNTER (OUTPATIENT)
Dept: LAB | Facility: MEDICAL CENTER | Age: 42
End: 2017-11-13
Attending: PSYCHIATRY & NEUROLOGY
Payer: COMMERCIAL

## 2017-11-13 DIAGNOSIS — E55.9 VITAMIN D DEFICIENCY: ICD-10-CM

## 2017-11-13 DIAGNOSIS — R20.2 TINGLING IN EXTREMITIES: ICD-10-CM

## 2017-11-13 DIAGNOSIS — E03.9 ACQUIRED HYPOTHYROIDISM: ICD-10-CM

## 2017-11-13 DIAGNOSIS — R51.9 FACIAL PAIN: ICD-10-CM

## 2017-11-13 DIAGNOSIS — G50.0 TRIGEMINAL NEURALGIA: ICD-10-CM

## 2017-11-13 DIAGNOSIS — E03.9 HYPOTHYROIDISM, UNSPECIFIED TYPE: ICD-10-CM

## 2017-11-13 LAB
ERYTHROCYTE [SEDIMENTATION RATE] IN BLOOD BY WESTERGREN METHOD: 11 MM/HOUR (ref 0–20)
GFR SERPL CREATININE-BSD FRML MDRD: >60 ML/MIN/1.73 M 2

## 2017-11-13 PROCEDURE — 82746 ASSAY OF FOLIC ACID SERUM: CPT

## 2017-11-13 PROCEDURE — 86225 DNA ANTIBODY NATIVE: CPT

## 2017-11-13 PROCEDURE — 82306 VITAMIN D 25 HYDROXY: CPT

## 2017-11-13 PROCEDURE — 86376 MICROSOMAL ANTIBODY EACH: CPT

## 2017-11-13 PROCEDURE — 85652 RBC SED RATE AUTOMATED: CPT

## 2017-11-13 PROCEDURE — 85652 RBC SED RATE AUTOMATED: CPT | Mod: 91

## 2017-11-13 PROCEDURE — 86038 ANTINUCLEAR ANTIBODIES: CPT

## 2017-11-13 PROCEDURE — 86235 NUCLEAR ANTIGEN ANTIBODY: CPT | Mod: 91

## 2017-11-13 PROCEDURE — 82607 VITAMIN B-12: CPT

## 2017-11-13 PROCEDURE — 86140 C-REACTIVE PROTEIN: CPT

## 2017-11-13 PROCEDURE — 86694 HERPES SIMPLEX NES ANTBDY: CPT

## 2017-11-13 PROCEDURE — 80048 BASIC METABOLIC PNL TOTAL CA: CPT

## 2017-11-13 PROCEDURE — 86800 THYROGLOBULIN ANTIBODY: CPT

## 2017-11-13 PROCEDURE — 86695 HERPES SIMPLEX TYPE 1 TEST: CPT

## 2017-11-13 PROCEDURE — 86696 HERPES SIMPLEX TYPE 2 TEST: CPT

## 2017-11-13 PROCEDURE — 82607 VITAMIN B-12: CPT | Mod: 91

## 2017-11-13 PROCEDURE — 82306 VITAMIN D 25 HYDROXY: CPT | Mod: 91

## 2017-11-13 PROCEDURE — 83516 IMMUNOASSAY NONANTIBODY: CPT

## 2017-11-13 PROCEDURE — 84443 ASSAY THYROID STIM HORMONE: CPT

## 2017-11-13 PROCEDURE — 84439 ASSAY OF FREE THYROXINE: CPT

## 2017-11-13 PROCEDURE — 36415 COLL VENOUS BLD VENIPUNCTURE: CPT

## 2017-11-13 PROCEDURE — 86431 RHEUMATOID FACTOR QUANT: CPT

## 2017-11-14 ENCOUNTER — TELEPHONE (OUTPATIENT)
Dept: MEDICAL GROUP | Facility: MEDICAL CENTER | Age: 42
End: 2017-11-14

## 2017-11-14 LAB
25(OH)D3 SERPL-MCNC: 23 NG/ML (ref 30–100)
25(OH)D3 SERPL-MCNC: 23 NG/ML (ref 30–100)
ANION GAP SERPL CALC-SCNC: 7 MMOL/L (ref 0–11.9)
BUN SERPL-MCNC: 16 MG/DL (ref 8–22)
CALCIUM SERPL-MCNC: 9.7 MG/DL (ref 8.5–10.5)
CHLORIDE SERPL-SCNC: 106 MMOL/L (ref 96–112)
CO2 SERPL-SCNC: 25 MMOL/L (ref 20–33)
CREAT SERPL-MCNC: 0.72 MG/DL (ref 0.5–1.4)
CRP SERPL HS-MCNC: 0.05 MG/DL (ref 0–0.75)
ERYTHROCYTE [SEDIMENTATION RATE] IN BLOOD BY WESTERGREN METHOD: 6 MM/HOUR (ref 0–20)
FOLATE SERPL-MCNC: 13.6 NG/ML
GLUCOSE SERPL-MCNC: 102 MG/DL (ref 65–99)
POTASSIUM SERPL-SCNC: 3.6 MMOL/L (ref 3.6–5.5)
RHEUMATOID FACT SER IA-ACNC: <10 IU/ML (ref 0–14)
SODIUM SERPL-SCNC: 138 MMOL/L (ref 135–145)
T4 FREE SERPL-MCNC: 1.14 NG/DL (ref 0.53–1.43)
THYROPEROXIDASE AB SERPL-ACNC: 0.4 IU/ML (ref 0–9)
TSH SERPL DL<=0.005 MIU/L-ACNC: 1.98 UIU/ML (ref 0.3–3.7)
VIT B12 SERPL-MCNC: 1106 PG/ML (ref 211–911)
VIT B12 SERPL-MCNC: 1191 PG/ML (ref 211–911)

## 2017-11-14 RX ORDER — ERGOCALCIFEROL 1.25 MG/1
50000 CAPSULE ORAL
Qty: 12 CAP | Refills: 0 | Status: SHIPPED | OUTPATIENT
Start: 2017-11-14 | End: 2020-08-13

## 2017-11-14 NOTE — TELEPHONE ENCOUNTER
Please let pt know that the lab is wnl except for the vitamin d.  It is low.  Will send in ergocalciferol.  Needs 12 weeks of vitamin d replacement once weekly.  Then start over the counter Vitamin D3 at least 2000 units daily.  Herpes test is still pending

## 2017-11-15 LAB
CENTROMERE IGG TITR SER IF: 3 AU/ML (ref 0–40)
ENA JO1 AB TITR SER: 0 AU/ML (ref 0–40)
ENA SCL70 IGG SER QL: 25 AU/ML (ref 0–40)
ENA SM IGG SER-ACNC: 2 AU/ML (ref 0–40)
ENA SS-B IGG SER IA-ACNC: 0 AU/ML (ref 0–40)
HSV1 GG IGG SER-ACNC: 0.74 IV
HSV1+2 IGG SER IA-ACNC: >22.4 IV
HSV2 GG IGG SER-ACNC: 0.28 IV
NUCLEAR IGG SER QL IA: NORMAL
RIBOSOMAL P AB SER-ACNC: 5 AU/ML (ref 0–40)
SSA52 R0ENA AB IGG Q0420: 3 AU/ML (ref 0–40)
SSA60 R0ENA AB IGG Q0419: 4 AU/ML (ref 0–40)
THYROGLOB AB SERPL-ACNC: 3.3 IU/ML (ref 0–4)
U1 SNRNP IGG SER QL: 0 AU/ML (ref 0–40)

## 2017-11-21 ENCOUNTER — TELEPHONE (OUTPATIENT)
Dept: NEUROLOGY | Facility: MEDICAL CENTER | Age: 42
End: 2017-11-21

## 2017-11-22 NOTE — TELEPHONE ENCOUNTER
ago  (11/13/17) 8d ago  (11/13/17) 1yr ago  (6/30/16)     25-Hydroxy   Vitamin D 25 30 - 100 ng/mL 23        Due to Vit D deficiency, please take vit D-3   4000unit    daily

## 2017-11-24 ENCOUNTER — APPOINTMENT (OUTPATIENT)
Dept: RADIOLOGY | Facility: MEDICAL CENTER | Age: 42
End: 2017-11-24
Attending: PSYCHIATRY & NEUROLOGY
Payer: COMMERCIAL

## 2017-12-11 ENCOUNTER — HOSPITAL ENCOUNTER (OUTPATIENT)
Dept: RADIOLOGY | Facility: MEDICAL CENTER | Age: 42
End: 2017-12-11
Attending: PSYCHIATRY & NEUROLOGY
Payer: COMMERCIAL

## 2017-12-11 DIAGNOSIS — E03.9 HYPOTHYROIDISM, UNSPECIFIED TYPE: ICD-10-CM

## 2017-12-11 DIAGNOSIS — R20.2 TINGLING IN EXTREMITIES: ICD-10-CM

## 2017-12-11 DIAGNOSIS — R51.9 FACIAL PAIN: ICD-10-CM

## 2017-12-11 PROCEDURE — 70553 MRI BRAIN STEM W/O & W/DYE: CPT

## 2017-12-11 PROCEDURE — A9579 GAD-BASE MR CONTRAST NOS,1ML: HCPCS | Performed by: PSYCHIATRY & NEUROLOGY

## 2017-12-11 PROCEDURE — 72156 MRI NECK SPINE W/O & W/DYE: CPT

## 2017-12-11 PROCEDURE — 700117 HCHG RX CONTRAST REV CODE 255: Performed by: PSYCHIATRY & NEUROLOGY

## 2017-12-11 RX ADMIN — GADODIAMIDE 15 ML: 287 INJECTION INTRAVENOUS at 14:55

## 2017-12-12 ENCOUNTER — TELEPHONE (OUTPATIENT)
Dept: MEDICAL GROUP | Facility: MEDICAL CENTER | Age: 42
End: 2017-12-12

## 2017-12-13 NOTE — TELEPHONE ENCOUNTER
Both MRI brain and cervical spine are w nl.  After she finishes the ergocalciferol  She needs to take vitamin d3 2000 units daily otc.

## 2017-12-13 NOTE — TELEPHONE ENCOUNTER
1. Caller Name: Pt                      Call Back Number: [phone]    2. Message: Pt called asking you to review MRI results she had done for Dr. Garibay.   Pt also states she finished her Vit D rx and is wondering if she should continue.     3. Patient approves office to leave a detailed voicemail/MyChart message: yes

## 2018-02-11 DIAGNOSIS — E03.9 ACQUIRED HYPOTHYROIDISM: ICD-10-CM

## 2018-02-12 RX ORDER — LEVOTHYROXINE SODIUM 0.05 MG/1
TABLET ORAL
Qty: 90 TAB | Refills: 2 | Status: SHIPPED | OUTPATIENT
Start: 2018-02-12 | End: 2018-02-23 | Stop reason: SDUPTHER

## 2018-02-21 ENCOUNTER — OFFICE VISIT (OUTPATIENT)
Dept: NEUROLOGY | Facility: MEDICAL CENTER | Age: 43
End: 2018-02-21
Payer: COMMERCIAL

## 2018-02-21 VITALS
RESPIRATION RATE: 16 BRPM | TEMPERATURE: 98.2 F | WEIGHT: 146.61 LBS | OXYGEN SATURATION: 96 % | DIASTOLIC BLOOD PRESSURE: 58 MMHG | BODY MASS INDEX: 25.98 KG/M2 | HEART RATE: 72 BPM | HEIGHT: 63 IN | SYSTOLIC BLOOD PRESSURE: 98 MMHG

## 2018-02-21 DIAGNOSIS — R51.9 FACIAL PAIN: ICD-10-CM

## 2018-02-21 PROCEDURE — 99213 OFFICE O/P EST LOW 20 MIN: CPT | Performed by: PSYCHIATRY & NEUROLOGY

## 2018-02-21 NOTE — PROGRESS NOTES
NEUROLOGY NOTE    Referring Physician  RONALD Borden      CHIEF COMPLAINT:  Sharp pain affecting the left face since June 2017  Burning sensation over occipital since Oct 2017  Also numbness over the left limbs    Chief Complaint   Patient presents with   • Follow-Up     Hypothyroidism       PRESENT ILLNESS:   Sharp pain affecting the left face since June 2017  Burning sensation over occipital since Oct 2017  Also numbness over the left limbs  Neck was tight for weeks    The patient has hx of hypothyrodism    PAST MEDICAL HISTORY:  Past Medical History:   Diagnosis Date   • Allergy    • Family history of breast cancer in mother    • GERD (gastroesophageal reflux disease)    • Hypothyroidism    • Proteinuria     ? etiology.  renal us wnl   • Scoliosis    • Temporomandibular joint-pain-dysfunction syndrome (TMJ)    • Tinnitus    • Trigeminal neuralgia of left side of face        PAST SURGICAL HISTORY:  Past Surgical History:   Procedure Laterality Date   • GANGLION EXCISION  9/22/08    Performed by LYNETTE HUBER at SURGERY SAME DAY [facility] ORS   • GANGLION EXCISION      rt wrist       FAMILY HISTORY:  Family History   Problem Relation Age of Onset   • Cancer Mother      breast   • Heart Disease Father    • Diabetes Maternal Grandmother    • Hypertension Maternal Grandmother    • Diabetes Paternal Grandmother        SOCIAL HISTORY:  Social History     Social History   • Marital status:      Spouse name: N/A   • Number of children: N/A   • Years of education: N/A     Occupational History   • Not on file.     Social History Main Topics   • Smoking status: Never Smoker   • Smokeless tobacco: Never Used   • Alcohol use 0.0 oz/week      Comment: occasional   • Drug use: No   • Sexual activity: Not on file     Other Topics Concern   • Not on file     Social History Narrative   • No narrative on file     ALLERGIES:  Allergies   Allergen Reactions   • Amoxicillin Itching and Unspecified     Tight bumpy  "lips     TOBHX  History   Smoking Status   • Never Smoker   Smokeless Tobacco   • Never Used     ALCHX  History   Alcohol Use   • 0.0 oz/week     Comment: occasional     DRUGHX  History   Drug Use No           MEDICATIONS:  Current Outpatient Prescriptions   Medication   • levothyroxine (SYNTHROID) 50 MCG Tab   • vitamin D, Ergocalciferol, (DRISDOL) 51245 units Cap capsule   • cyanocobalamin (VITAMIN B-12) 100 MCG Tab   • Probiotic Product (PROBIOTIC FORMULA) Cap   • Multiple Vitamins-Minerals (MULTIVITAL) TABS   • baclofen (LIORESAL) 20 MG tablet   • Cholecalciferol (VITAMIN D) 1000 UNIT CAPS     No current facility-administered medications for this visit.        REVIEW OF SYSTEM:    Constitutional: Denies fevers, Denies weight changes   Eyes: Denies changes in vision, no eye pain   Ears/Nose/Throat/Mouth: Denies nasal congestion or sore throat   Cardiovascular: Denies chest pain or palpitations   Respiratory: Denies SOB.   Gastrointestinal/Hepatic: Denies abdominal pain, nausea, vomiting, diarrhea, constipation or GI bleeding   Genitourinary: Denies bladder dysfunction, dysuria or frequency   Musculoskeletal/Rheum: Denies joint pain and swelling   Skin/Breast: Denies rash, denies breast lumps or discharge   Neurological: left facial pain  Psychiatric: denies mood disorder   Endocrine: hypothyrodism  Heme/Oncology/Lymph Nodes: Denies enlarged lymph nodes, denies brusing or known bleeding disorder   Allergic/Immunologic: Denies hx of allergies         PHYSICAL AND NEUROLOGICAL EXMAINATIONS:  VITAL SIGNS: BP (!) 98/58   Pulse 72   Temp 36.8 °C (98.2 °F)   Resp 16   Ht 1.6 m (5' 3\")   Wt 66.5 kg (146 lb 9.7 oz)   SpO2 96%   BMI 25.97 kg/m²   CURRENT WEIGHT: [unfilled]  BMI: Body mass index is 25.97 kg/m².  PREVIOUS WEIGHTS:  Wt Readings from Last 25 Encounters:   02/21/18 66.5 kg (146 lb 9.7 oz)   11/08/17 64 kg (141 lb 1.6 oz)   10/25/17 63 kg (138 lb 12.8 oz)   10/13/17 63.1 kg (139 lb 3.2 oz)   10/04/17 62.6 kg " (138 lb)   06/26/17 63.8 kg (140 lb 11.2 oz)   06/15/16 65.3 kg (144 lb)   02/29/16 65.8 kg (145 lb)   01/13/16 65.8 kg (145 lb)   01/23/15 64.9 kg (143 lb)   05/14/14 62.6 kg (138 lb)   04/29/14 62.6 kg (138 lb)   03/27/14 62.1 kg (137 lb)   01/07/14 64.9 kg (143 lb)   02/06/13 62.1 kg (137 lb)   12/05/11 61.2 kg (135 lb)   09/30/11 60.8 kg (134 lb)   11/08/10 62.1 kg (137 lb)   06/04/10 60.3 kg (133 lb)   05/25/10 60.8 kg (134 lb)   03/24/10 59 kg (130 lb)   03/19/10 59.9 kg (132 lb)   06/24/09 58.5 kg (129 lb)   05/29/09 59.4 kg (131 lb)       General appearance of patient: WDWN(+) NAD(+)    EYES  o Fundus : Papilledem(-) Exudates(-) Hemorrhage(-)  Nervous System  Orientation to time, place and person(+)  Memory normal(+)  Language: aphasia(-)  Knowledge: past(+) Current(+)  Attention(+)  Cranial Nerves  • Nerve 2: intact  • Nerve 3,4,6: intact  • Nerve 5 :left facial pain and decreased pinprick   • Nerve 7: intact  • Nerve 8: intact  • Nerve 9 & 10: intact  • Nerve 11: intact  • Nerve 12: intact  Muscle Power and muscle tone: symmetric, normal in upper and lower  Sensory System: decreased pinprick over left face  Reflexes: symmetric throughout  Cerebellar Function FNP normal   Gait : Steady(+) TandemGait steady(+)  Heart and Vascular  Peripheral Vasucular system : Edema (-) Swelling(-)  RHB, Breathing sound clear  abdomen bowel sound normoactive  Extremities freely moveable  Joints no contracture       NEUROIMAGING: I reviewed the MRI/CT of brain       IMPRESSION:    1. Left facial pain since June 2017, left occipital burning since Oct 2017, and left limbs tingling for weeks  2. Hx of hypothyroidism, Benitez Palsy   3. Vit D deficiencey      PLAN/RECOMMENDATIONS:    Due to Vit D deficiency, please take vit D-3   4000unit    daily     MRI of brain and MRI of cervical not remarkable  Since the tingling is in remission--- we will just observe  Please give us a call if anything new    SIGNATURE:  Medardo  [place]      CC:  RONALD Borden    Not remarkable  ________________________________________________________________________        \

## 2018-02-22 NOTE — PATIENT INSTRUCTIONS
IMPRESSION:    1. Left facial pain since June 2017, left occipital burning since Oct 2017, and left limbs tingling for weeks  2. Hx of hypothyroidism, Benitez Palsy   3. Vit D deficiencey      PLAN/RECOMMENDATIONS:    Due to Vit D deficiency, please take vit D-3   4000unit    daily     MRI of brain and MRI of cervical not remarkable  Since the tingling is in remission--- we will just observe  Please give us a call if anything new    SIGNATURE:  Medardo Garibay      CC:  RONALD Borden    Not remarkable  ________________________________________________________________________        \    IMPRESSION:    1. Left facial pain since June 2017, left occipital burning since Oct 2017, and left limbs tingling for weeks  2. Hx of hypothyroidism, Benitez Palsy   3. Vit D deficiencey      PLAN/RECOMMENDATIONS:    Due to Vit D deficiency, please take vit D-3   4000unit    daily     MRI of brain and MRI of cervical not remarkable  Since the tingling is in remission--- we will just observe  Please give us a call if anything new    SIGNATURE:  Medardo Garibay      CC:  RONALD Borden    Not remarkable  ________________________________________________________________________        \

## 2018-02-23 DIAGNOSIS — E03.9 ACQUIRED HYPOTHYROIDISM: ICD-10-CM

## 2018-02-25 RX ORDER — LEVOTHYROXINE SODIUM 0.05 MG/1
50 TABLET ORAL
Qty: 90 TAB | Refills: 2 | Status: SHIPPED | OUTPATIENT
Start: 2018-02-25 | End: 2019-04-11 | Stop reason: SDUPTHER

## 2018-05-10 ENCOUNTER — APPOINTMENT (OUTPATIENT)
Dept: RADIOLOGY | Facility: IMAGING CENTER | Age: 43
End: 2018-05-10
Attending: PHYSICIAN ASSISTANT
Payer: COMMERCIAL

## 2018-05-10 ENCOUNTER — OFFICE VISIT (OUTPATIENT)
Dept: URGENT CARE | Facility: CLINIC | Age: 43
End: 2018-05-10
Payer: COMMERCIAL

## 2018-05-10 VITALS
TEMPERATURE: 100.9 F | SYSTOLIC BLOOD PRESSURE: 110 MMHG | DIASTOLIC BLOOD PRESSURE: 72 MMHG | WEIGHT: 143 LBS | RESPIRATION RATE: 16 BRPM | BODY MASS INDEX: 25.34 KG/M2 | HEIGHT: 63 IN | HEART RATE: 111 BPM | OXYGEN SATURATION: 97 %

## 2018-05-10 DIAGNOSIS — R05.9 COUGH: ICD-10-CM

## 2018-05-10 DIAGNOSIS — J01.40 ACUTE PANSINUSITIS, RECURRENCE NOT SPECIFIED: ICD-10-CM

## 2018-05-10 DIAGNOSIS — J22 LRTI (LOWER RESPIRATORY TRACT INFECTION): ICD-10-CM

## 2018-05-10 PROCEDURE — 99214 OFFICE O/P EST MOD 30 MIN: CPT | Performed by: PHYSICIAN ASSISTANT

## 2018-05-10 PROCEDURE — 71046 X-RAY EXAM CHEST 2 VIEWS: CPT | Mod: TC,FY | Performed by: PHYSICIAN ASSISTANT

## 2018-05-10 RX ORDER — DOXYCYCLINE HYCLATE 100 MG
100 TABLET ORAL 2 TIMES DAILY
Qty: 14 TAB | Refills: 0 | Status: SHIPPED | OUTPATIENT
Start: 2018-05-10 | End: 2018-05-17

## 2018-05-10 RX ORDER — CODEINE PHOSPHATE AND GUAIFENESIN 10; 100 MG/5ML; MG/5ML
5 SOLUTION ORAL EVERY 6 HOURS PRN
Qty: 120 ML | Refills: 0 | Status: SHIPPED | OUTPATIENT
Start: 2018-05-10 | End: 2018-05-17

## 2018-05-10 ASSESSMENT — ENCOUNTER SYMPTOMS
RHINORRHEA: 1
MYALGIAS: 1
SHORTNESS OF BREATH: 1
CHILLS: 1
WHEEZING: 0
NAUSEA: 0
VOMITING: 0
PALPITATIONS: 0
HEADACHES: 1
SENSORY CHANGE: 0
COUGH: 1
FEVER: 1
SPUTUM PRODUCTION: 1
TINGLING: 0
SORE THROAT: 1
DIARRHEA: 0
SINUS PAIN: 1
ABDOMINAL PAIN: 0

## 2018-05-10 ASSESSMENT — PATIENT HEALTH QUESTIONNAIRE - PHQ9: CLINICAL INTERPRETATION OF PHQ2 SCORE: 0

## 2018-05-10 NOTE — PROGRESS NOTES
Subjective:      Koki Arguello is a 42 y.o. female who presents with URI (dizzyness, fever, nausea,sinus headache,sore throat, coughing green phlegm, hard to breath, x5days)            Cough   This is a new problem. Episode onset: 5 days  The problem has been gradually worsening. The cough is productive of sputum. Associated symptoms include chills, a fever, headaches, myalgias, nasal congestion, postnasal drip, rhinorrhea, a sore throat (mild) and shortness of breath. Pertinent negatives include no chest pain, ear congestion, ear pain, rash or wheezing. Treatments tried: theraflu, IBuprofen, Zicam  There is no history of asthma, bronchitis or pneumonia.       Past Medical History:   Diagnosis Date   • Allergy    • Family history of breast cancer in mother    • GERD (gastroesophageal reflux disease)    • Hypothyroidism    • Proteinuria     ? etiology.  renal us wnl   • Scoliosis    • Temporomandibular joint-pain-dysfunction syndrome (TMJ)    • Tinnitus    • Trigeminal neuralgia of left side of face        Past Surgical History:   Procedure Laterality Date   • GANGLION EXCISION  9/22/08    Performed by LYNETTE HUBER at [facility]   • GANGLION EXCISION      rt wrist       Family History   Problem Relation Age of Onset   • Cancer Mother      breast   • Heart Disease Father    • Diabetes Maternal Grandmother    • Hypertension Maternal Grandmother    • Diabetes Paternal Grandmother        Allergies   Allergen Reactions   • Amoxicillin Itching and Unspecified     Tight bumpy lips       Medications, Allergies, and current problem list reviewed today in Epic    Review of Systems   Constitutional: Positive for chills, fever and malaise/fatigue.   HENT: Positive for congestion, postnasal drip, rhinorrhea, sinus pain and sore throat (mild). Negative for ear discharge and ear pain.    Respiratory: Positive for cough, sputum production and shortness of breath. Negative for wheezing.   "  Cardiovascular: Negative for chest pain, palpitations and leg swelling.   Gastrointestinal: Negative for abdominal pain, diarrhea, nausea and vomiting.   Musculoskeletal: Positive for myalgias.   Skin: Negative for rash.   Neurological: Positive for headaches. Negative for tingling and sensory change.     All other systems reviewed and are negative.        Objective:     /72   Pulse (!) 111   Temp (!) 38.3 °C (100.9 °F)   Resp 16   Ht 1.6 m (5' 3\")   Wt 64.9 kg (143 lb)   LMP 05/03/2018   SpO2 97%   Breastfeeding? No   BMI 25.33 kg/m²      Physical Exam   Constitutional: She is oriented to person, place, and time. She appears well-developed and well-nourished. She appears distressed (mildly ill appearing ).   HENT:   Head: Normocephalic and atraumatic.   Right Ear: Tympanic membrane, external ear and ear canal normal.   Left Ear: Tympanic membrane, external ear and ear canal normal.   Nose: Mucosal edema and rhinorrhea present. Right sinus exhibits maxillary sinus tenderness. Left sinus exhibits maxillary sinus tenderness.   Mouth/Throat: Uvula is midline, oropharynx is clear and moist and mucous membranes are normal. No oropharyngeal exudate.   Eyes: Conjunctivae are normal.   Neck: Neck supple.   Cardiovascular: Normal rate, regular rhythm and normal heart sounds.  Exam reveals no gallop and no friction rub.    No murmur heard.  Pulmonary/Chest: Effort normal. No respiratory distress. She has wheezes (mild wheezes in lower lung fields ).   Lymphadenopathy:     She has no cervical adenopathy.   Neurological: She is alert and oriented to person, place, and time. No cranial nerve deficit.   Skin: Skin is warm and dry. No rash noted.   Psychiatric: She has a normal mood and affect. Her behavior is normal. Judgment and thought content normal.     HISTORY/REASON FOR EXAM:  Cough  Cough and congestion    TECHNIQUE/EXAM DESCRIPTION AND NUMBER OF VIEWS:  Two views of the chest.    COMPARISON: "  None.    FINDINGS:    No pulmonary infiltrates or consolidations are noted.  No pleural effusions, no pneumothorax are appreciated.  Normal cardiopericardial silhouette.  Visualized osseous structures are unremarkable.   Impression         1. No active cardiopulmonary abnormalities are identified.               Assessment/Plan:     1. LRTI (lower respiratory tract infection)  DX-CHEST-2 VIEWS    doxycycline (VIBRAMYCIN) 100 MG Tab    guaifenesin-codeine (ROBITUSSIN AC) Solution oral solution   2. Acute pansinusitis, recurrence not specified           Current Outpatient Prescriptions:   •  doxycycline (VIBRAMYCIN) 100 MG Tab, Take 1 Tab by mouth 2 times a day for 7 days., Disp: 14 Tab, Rfl: 0  •  guaifenesin-codeine (ROBITUSSIN AC) Solution oral solution, Take 5 mL by mouth every 6 hours as needed for Cough for up to 7 days., Disp: 120 mL, Rfl: 0  Sedation side effects discussed. No Driving or alcohol with medication given.    [facility] Aware web site evaluation: I have obtained and reviewed patient utilization report from [facility] pharmacy database prior to writing prescription for controlled substance II, III or IV per [place] bill . Based on the report and my clinical assessment the prescription is medically necessary.       Differential diagnoses, Supportive care, and indications for immediate follow-up discussed with patient.   Instructed to return to clinic or nearest emergency department for any change in condition, further concerns, or worsening of symptoms.    The patient demonstrated a good understanding and agreed with the treatment plan.    Desi Dolan P.A.-C.

## 2018-05-10 NOTE — LETTER
May 10, 2018         Patient: Koki Arguello   YOB: 1975   Date of Visit: 5/10/2018           To Whom it May Concern:    Koki Arguello was seen in my clinic on 5/10/2018. She may return to gym class or sports on 5/14/18 if feeling better.    If you have any questions or concerns, please don't hesitate to call.        Sincerely,           Desi Dolan P.A.-C.  Electronically Signed

## 2018-05-10 NOTE — LETTER
May 10, 2018         Patient: Koki Arguello   YOB: 1975   Date of Visit: 5/10/2018           To Whom it May Concern:    Koki Arguello was seen in my clinic on 5/10/2018. She may return to work on 5/14/18 if feeling better.    If you have any questions or concerns, please don't hesitate to call.        Sincerely,           Desi Dolan P.A.-C.  Electronically Signed

## 2018-11-03 ENCOUNTER — HOSPITAL ENCOUNTER (OUTPATIENT)
Facility: MEDICAL CENTER | Age: 43
End: 2018-11-03
Payer: COMMERCIAL

## 2018-11-03 LAB
BDY FAT % MEASURED: 32.7 %
BP DIAS: 72 MMHG
BP SYS: 110 MMHG
CHOLEST SERPL-MCNC: 163 MG/DL (ref 100–199)
DIABETES HTDIA: NO
EVENT NAME HTEVT: NORMAL
FASTING STATUS PATIENT QL REPORTED: NORMAL
GLUCOSE SERPL-MCNC: 93 MG/DL (ref 65–99)
HDLC SERPL-MCNC: 71 MG/DL
HYPERTENSION HTHYP: NO
LDLC SERPL CALC-MCNC: 75 MG/DL
SCREENING LOC CITY HTCIT: NORMAL
SCREENING LOC STATE HTSTA: NORMAL
SCREENING LOCATION HTLOC: NORMAL
SUBSCRIBER ID HTSID: NORMAL
TRIGL SERPL-MCNC: 86 MG/DL (ref 0–149)

## 2019-04-11 ENCOUNTER — OFFICE VISIT (OUTPATIENT)
Dept: MEDICAL GROUP | Facility: MEDICAL CENTER | Age: 44
End: 2019-04-11
Payer: COMMERCIAL

## 2019-04-11 VITALS
TEMPERATURE: 97.7 F | WEIGHT: 145 LBS | OXYGEN SATURATION: 98 % | DIASTOLIC BLOOD PRESSURE: 70 MMHG | BODY MASS INDEX: 25.69 KG/M2 | HEIGHT: 63 IN | SYSTOLIC BLOOD PRESSURE: 102 MMHG | HEART RATE: 66 BPM

## 2019-04-11 DIAGNOSIS — M54.2 NECK PAIN ON RIGHT SIDE: ICD-10-CM

## 2019-04-11 DIAGNOSIS — M53.3 CHRONIC RIGHT SI JOINT PAIN: ICD-10-CM

## 2019-04-11 DIAGNOSIS — R22.30 SHOULDER MASS: ICD-10-CM

## 2019-04-11 DIAGNOSIS — G89.29 CHRONIC RIGHT SI JOINT PAIN: ICD-10-CM

## 2019-04-11 DIAGNOSIS — M79.604 RIGHT LEG PAIN: ICD-10-CM

## 2019-04-11 DIAGNOSIS — E03.9 ACQUIRED HYPOTHYROIDISM: ICD-10-CM

## 2019-04-11 DIAGNOSIS — R20.0 NUMBNESS OF FINGER: ICD-10-CM

## 2019-04-11 PROCEDURE — 99214 OFFICE O/P EST MOD 30 MIN: CPT | Performed by: NURSE PRACTITIONER

## 2019-04-11 RX ORDER — LEVOTHYROXINE SODIUM 0.05 MG/1
50 TABLET ORAL
Qty: 90 TAB | Refills: 2 | Status: SHIPPED | OUTPATIENT
Start: 2019-04-11 | End: 2020-07-06

## 2019-04-11 RX ORDER — MELOXICAM 7.5 MG/1
7.5 TABLET ORAL DAILY
Qty: 14 TAB | Refills: 0 | Status: SHIPPED | OUTPATIENT
Start: 2019-04-11 | End: 2020-07-23

## 2019-04-11 NOTE — PROGRESS NOTES
Subjective:     Koki Arguello is a 43 y.o. female who presents with rt SI joint pain.    HPI:   Seen in f/u for rt SI joint pain.  Pain started 2-3 weeks ago.  Its a burning pain.  Starts in rt SI joint, radiates down to her hip and into her leg.  She is using otc meds and ibuprofen.  Getting burning pain.  No numbness.  She was having this pain long term but got worse with changind chairs at work  Rt neck is also painful.  She has numbness in her rt index finger.  It is slowly getting better.   Initially her jpain pain was severe.  She had trouble getting out of bed.   Both of these issues started after she changed to a bouncing ball chair.    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  No pain.   She is doing stretching exercises for the areas of pain.   She is due lab for her synthroid.  She is stable on meds.  Needs med refill      Patient Active Problem List    Diagnosis Date Noted   • Facial pain 11/08/2017   • Tingling in extremities 11/08/2017   • Trigeminal neuralgia of left side of face 10/13/2017   • TMJ pain dysfunction syndrome 06/26/2017   • GERD (gastroesophageal reflux disease) 01/13/2016   • Allergic rhinitis 02/06/2013   • Tinnitus 11/08/2010   • Proteinuria 03/24/2010   • Hypothyroidism 03/19/2010   • Preventative health care 02/19/2010   • Scoliosis    • Family history of breast cancer in mother 01/06/2010       Current medicines (including changes today)  Current Outpatient Prescriptions   Medication Sig Dispense Refill   • meloxicam (MOBIC) 7.5 MG Tab Take 1 Tab by mouth every day. 14 Tab 0   • levothyroxine (SYNTHROID) 50 MCG Tab Take 1 Tab by mouth Every morning on an empty stomach. ON EMPTY STOMACH 90 Tab 2   • vitamin D, Ergocalciferol, (DRISDOL) 01234 units Cap capsule Take 1 Cap by mouth every 7 days. 12 Cap 0   • cyanocobalamin (VITAMIN B-12) 100 MCG Tab Take 400 mcg by mouth every day.     • Probiotic Product (PROBIOTIC FORMULA) Cap Take 1 Tab by mouth every day. 30 Cap  "0   • baclofen (LIORESAL) 20 MG tablet Take 1 Tab by mouth 3 times a day. (Patient not taking: Reported on 2/21/2018) 60 Tab 0   • Multiple Vitamins-Minerals (MULTIVITAL) TABS Take 1 Tab by mouth every day.     • Cholecalciferol (VITAMIN D) 1000 UNIT CAPS Take 2 Caps by mouth every day.       No current facility-administered medications for this visit.        Allergies   Allergen Reactions   • Amoxicillin Itching and Unspecified     Tight bumpy lips       ROS  Constitutional: Negative. Negative for fever, chills, weight loss, malaise/fatigue and diaphoresis.   HENT: Negative. Negative for hearing loss, ear pain, nosebleeds, congestion, sore throat, neck pain, tinnitus and ear discharge.   Respiratory: Negative. Negative for hemoptysis, sputum production, shortness of breath, wheezing and stridor. Prod cough d/t current URI  Cardiovascular: Negative. Negative for chest pain, palpitations, orthopnea, claudication, leg swelling and PND.   Gastrointestinal: Denies nausea, vomiting, diarrhea, constipation, heartburn, melena or hematochezia.  Genitourinary: Denies dysuria, hematuria, urinary incontinence, frequency or urgency.        Objective:     /70 (BP Location: Right arm, Patient Position: Sitting)   Pulse 66   Temp 36.5 °C (97.7 °F) (Temporal)   Ht 1.6 m (5' 3\")   Wt 65.8 kg (145 lb)   SpO2 98%  Body mass index is 25.69 kg/m².    Physical Exam:  Vitals reviewed.  Constitutional: Oriented to person, place, and time. appears well-developed and well-nourished. No distress.   Cardiovascular: Normal rate, regular rhythm, normal heart sounds and intact distal pulses. Exam reveals no gallop and no friction rub. No murmur heard. No carotid bruits.   Pulmonary/Chest: Effort normal and breath sounds normal. No stridor. No respiratory distress. no wheezes or rales. exhibits no tenderness.   Musculoskeletal: Normal range of motion. exhibits no edema. emelina pedal pulses 2+.  Lymphadenopathy: No cervical or " supraclavicular adenopathy.   Neurological: Alert and oriented to person, place, and time. exhibits normal muscle tone.  Skin: Skin is warm and dry. No diaphoresis.   Psychiatric: Normal mood and affect. Behavior is normal.      Assessment and Plan:     The following treatment plan was discussed:    1. Shoulder mass  DX-SHOULDER 2+ RIGHT    prob rt lipoma.  xray shoulder d/t neck pain and rt index finger numbness.  also xray neck.  f/u with pt with results.  consider MRI vs PT if pain continues   2. Neck pain on right side  DX-CERVICAL SPINE-2 OR 3 VIEWS    meloxicam (MOBIC) 7.5 MG Tab    do 2 weeks mobic for pain control.  f/u if sx continue   3. Numbness of finger  DX-CERVICAL SPINE-2 OR 3 VIEWS    meloxicam (MOBIC) 7.5 MG Tab    rt index finger   4. Chronic right SI joint pain  DX-LUMBAR SPINE-2 OR 3 VIEWS    DX-HIP-UNILATERAL-WITH PELVIS-1 VIEW RIGHT    meloxicam (MOBIC) 7.5 MG Tab    has been chronic but worse with changing or work chair.  change back to old chair.  do xray.  f/u with pt with results.  try mobic.     5. Right leg pain  DX-LUMBAR SPINE-2 OR 3 VIEWS    DX-HIP-UNILATERAL-WITH PELVIS-1 VIEW RIGHT    meloxicam (MOBIC) 7.5 MG Tab   6. Acquired hypothyroidism  levothyroxine (SYNTHROID) 50 MCG Tab    TSH    FREE THYROXINE    check tsh, t4.  refill synthroid         Followup: Return in about 6 months (around 10/11/2019).

## 2019-04-12 ENCOUNTER — HOSPITAL ENCOUNTER (OUTPATIENT)
Dept: RADIOLOGY | Facility: MEDICAL CENTER | Age: 44
End: 2019-04-12
Attending: NURSE PRACTITIONER
Payer: COMMERCIAL

## 2019-04-12 DIAGNOSIS — Z12.31 VISIT FOR SCREENING MAMMOGRAM: ICD-10-CM

## 2019-04-12 PROCEDURE — 77063 BREAST TOMOSYNTHESIS BI: CPT

## 2019-04-15 ENCOUNTER — TELEPHONE (OUTPATIENT)
Dept: MEDICAL GROUP | Facility: MEDICAL CENTER | Age: 44
End: 2019-04-15

## 2019-04-15 DIAGNOSIS — R92.30 DENSE BREASTS: ICD-10-CM

## 2019-04-15 DIAGNOSIS — R92.2 DENSE BREASTS: ICD-10-CM

## 2019-04-16 NOTE — TELEPHONE ENCOUNTER
Please let pt know that her mammogram shows dense breasts.  Dense breasts may hide some masses on a mammogram.  We recommend a sono cine as a better way to look more closely at the breast tissue and determine if any masses or nodules are present.  Typically insurance does not pay for this test.  It costs about $125.00.  Let me know if she would like me to order this test.

## 2019-04-19 ENCOUNTER — OFFICE VISIT (OUTPATIENT)
Dept: URGENT CARE | Facility: CLINIC | Age: 44
End: 2019-04-19
Payer: COMMERCIAL

## 2019-04-19 VITALS
OXYGEN SATURATION: 97 % | RESPIRATION RATE: 16 BRPM | BODY MASS INDEX: 25.52 KG/M2 | DIASTOLIC BLOOD PRESSURE: 80 MMHG | HEIGHT: 63 IN | SYSTOLIC BLOOD PRESSURE: 112 MMHG | WEIGHT: 144 LBS | HEART RATE: 91 BPM | TEMPERATURE: 98.2 F

## 2019-04-19 DIAGNOSIS — M54.5 ACUTE LOW BACK PAIN, UNSPECIFIED BACK PAIN LATERALITY, WITH SCIATICA PRESENCE UNSPECIFIED: ICD-10-CM

## 2019-04-19 DIAGNOSIS — R05.9 COUGH: ICD-10-CM

## 2019-04-19 DIAGNOSIS — J06.9 VIRAL UPPER RESPIRATORY TRACT INFECTION: ICD-10-CM

## 2019-04-19 DIAGNOSIS — R35.0 URINARY FREQUENCY: ICD-10-CM

## 2019-04-19 LAB
APPEARANCE UR: YELLOW
BILIRUB UR STRIP-MCNC: NORMAL MG/DL
COLOR UR AUTO: CLEAR
GLUCOSE UR STRIP.AUTO-MCNC: NORMAL MG/DL
KETONES UR STRIP.AUTO-MCNC: NORMAL MG/DL
LEUKOCYTE ESTERASE UR QL STRIP.AUTO: NORMAL
NITRITE UR QL STRIP.AUTO: NORMAL
PH UR STRIP.AUTO: 5.5 [PH] (ref 5–8)
PROT UR QL STRIP: NORMAL MG/DL
RBC UR QL AUTO: NORMAL
SP GR UR STRIP.AUTO: 1.01
UROBILINOGEN UR STRIP-MCNC: 0.2 MG/DL

## 2019-04-19 PROCEDURE — 81002 URINALYSIS NONAUTO W/O SCOPE: CPT | Performed by: NURSE PRACTITIONER

## 2019-04-19 PROCEDURE — 99214 OFFICE O/P EST MOD 30 MIN: CPT | Performed by: NURSE PRACTITIONER

## 2019-04-19 RX ORDER — BENZONATATE 100 MG/1
CAPSULE ORAL
Qty: 60 CAP | Refills: 0 | Status: SHIPPED | OUTPATIENT
Start: 2019-04-19 | End: 2020-08-13

## 2019-04-19 ASSESSMENT — ENCOUNTER SYMPTOMS
WHEEZING: 0
EYE PAIN: 0
SHORTNESS OF BREATH: 0
BRUISES/BLEEDS EASILY: 0
ORTHOPNEA: 0
CHILLS: 1
SINUS PAIN: 0
HEMOPTYSIS: 0
DIZZINESS: 0
CONSTIPATION: 0
NAUSEA: 0
COUGH: 1
SPUTUM PRODUCTION: 0
BACK PAIN: 0
HEADACHES: 1
MYALGIAS: 0
PALPITATIONS: 0
FEVER: 1
VOMITING: 0
DIARRHEA: 0
TINGLING: 0
ABDOMINAL PAIN: 0
FOCAL WEAKNESS: 0
SENSORY CHANGE: 0
SORE THROAT: 1
BLURRED VISION: 0
NECK PAIN: 0

## 2019-04-19 ASSESSMENT — LIFESTYLE VARIABLES: SUBSTANCE_ABUSE: 0

## 2019-04-19 NOTE — PROGRESS NOTES
Subjective:      Koki Arguello is a 43 y.o. female who presents with Cough (wet, productive cough, fever, headache, on & off 3 weeks) and Back Pain (low back pain, burning & pressure when urinating x 2 days)      Denies past medical, surgical or family history that is significant to today's problem.   RX or OTC medications-- reviewed with patient today.   Allergies   Allergen Reactions   • Amoxicillin Itching and Unspecified     Tight bumpy lips             HPI  This is a new problem.  Koki is a 43-year-old female who presents with a wet cough and low back pain with increased urination.  Cough started 3 weeks ago. Low back pain and increased urination started 2 days ago.  She is concerned about UTI and/ or worsening chest cold.   Associated symptoms include: nasal drainage ( intermittently), headache, low back ache that does not change with movements. Denies unusual activity, diarrhea, constipation.  Treatment tried: OTC theraflu for cough and cold symptoms and increased fluids for back discomfort.  No other aggravating or alleviating factors.             Review of Systems   Constitutional: Positive for chills and fever.   HENT: Positive for sore throat. Negative for ear pain and sinus pain.    Eyes: Negative for blurred vision and pain.   Respiratory: Positive for cough. Negative for hemoptysis, sputum production, shortness of breath and wheezing.    Cardiovascular: Negative for chest pain, palpitations and orthopnea.   Gastrointestinal: Negative for abdominal pain, constipation, diarrhea, nausea and vomiting.   Genitourinary: Negative for dysuria.   Musculoskeletal: Negative for back pain, myalgias and neck pain.   Skin: Negative for rash.   Neurological: Positive for headaches. Negative for dizziness, tingling, sensory change and focal weakness.   Endo/Heme/Allergies: Negative for environmental allergies. Does not bruise/bleed easily.   Psychiatric/Behavioral: Negative for substance abuse.           "Objective:     /80   Pulse 91   Temp 36.8 °C (98.2 °F) (Temporal)   Resp 16   Ht 1.6 m (5' 3\")   Wt 65.3 kg (144 lb)   SpO2 97%   BMI 25.51 kg/m²       Physical Exam   Constitutional: She is oriented to person, place, and time. Vital signs are normal. She appears well-developed and well-nourished. She is cooperative.  Non-toxic appearance. She does not have a sickly appearance. She does not appear ill. No distress.   HENT:   Head: Normocephalic.   Right Ear: Hearing, external ear and ear canal normal. Tympanic membrane is not injected and not erythematous. No middle ear effusion.   Left Ear: Hearing, external ear and ear canal normal. Tympanic membrane is not injected and not erythematous.  No middle ear effusion.   Nose: Rhinorrhea present. No mucosal edema. Right sinus exhibits no maxillary sinus tenderness and no frontal sinus tenderness. Left sinus exhibits no maxillary sinus tenderness and no frontal sinus tenderness.   Mouth/Throat: Uvula is midline. Posterior oropharyngeal erythema present. No oropharyngeal exudate, posterior oropharyngeal edema or tonsillar abscesses.   Eyes: Pupils are equal, round, and reactive to light. Conjunctivae and lids are normal.   Neck: Trachea normal, normal range of motion and full passive range of motion without pain. Neck supple.   Cardiovascular: Normal rate, regular rhythm and normal pulses.  PMI is not displaced.    Pulmonary/Chest: Effort normal and breath sounds normal. No respiratory distress. She has no decreased breath sounds. She has no wheezes. She has no rhonchi. She has no rales.   Abdominal: Soft. Normal appearance. There is no tenderness.   Musculoskeletal: Normal range of motion.   Lymphadenopathy:        Head (right side): No submental, no submandibular and no tonsillar adenopathy present.        Head (left side): No submental, no submandibular and no tonsillar adenopathy present.     She has no cervical adenopathy.        Right: No supraclavicular " adenopathy present.        Left: No supraclavicular adenopathy present.   Neurological: She is alert and oriented to person, place, and time. She has normal strength. Gait normal.   Skin: Skin is warm and dry. Capillary refill takes less than 2 seconds.   Psychiatric: She has a normal mood and affect. Her speech is normal and behavior is normal. Thought content normal.   Nursing note and vitals reviewed.              Assessment/Plan:       1. Acute low back pain, unspecified back pain laterality, with sciatica presence unspecified  POCT Urinalysis   2. Viral upper respiratory tract infection     3. Cough  benzonatate (TESSALON) 100 MG Cap   4. Urinary frequency         Educated in proper administration of medication(s) ordered today including safety, possible SE, risks, benefits, rationale and alternatives to therapy.     Discussed that I felt this was viral in nature. Did not see any evidence of a bacterial process.     OTC antihistamine of choice. Follow manufactures dosing and safety guidelines.     Keep well hydrated    Return to clinic or PCP  4-5  days if current symptoms are not resolving in a satisfactory manner or sooner if new or worsening symptoms occur.   Patient was advised of signs and symptoms which would warrant further evaluation and /or emergent evaluation in ER.  Verbalized agreement with this treatment plan and seemed to understand without barriers. Questions were encouraged and answered to patients satisfaction.     Aftercare instructions were given to pt

## 2019-07-24 ENCOUNTER — HOSPITAL ENCOUNTER (OUTPATIENT)
Dept: RADIOLOGY | Facility: MEDICAL CENTER | Age: 44
End: 2019-07-24
Attending: NURSE PRACTITIONER
Payer: COMMERCIAL

## 2019-12-09 ENCOUNTER — HOSPITAL ENCOUNTER (OUTPATIENT)
Dept: LAB | Facility: MEDICAL CENTER | Age: 44
End: 2019-12-09
Attending: NURSE PRACTITIONER
Payer: COMMERCIAL

## 2019-12-09 DIAGNOSIS — E03.9 ACQUIRED HYPOTHYROIDISM: ICD-10-CM

## 2019-12-09 LAB
T4 FREE SERPL-MCNC: 0.91 NG/DL (ref 0.53–1.43)
TSH SERPL DL<=0.005 MIU/L-ACNC: 3.14 UIU/ML (ref 0.38–5.33)

## 2019-12-09 PROCEDURE — 84443 ASSAY THYROID STIM HORMONE: CPT

## 2019-12-09 PROCEDURE — 84439 ASSAY OF FREE THYROXINE: CPT

## 2019-12-09 PROCEDURE — 36415 COLL VENOUS BLD VENIPUNCTURE: CPT

## 2019-12-10 ENCOUNTER — TELEPHONE (OUTPATIENT)
Dept: MEDICAL GROUP | Facility: MEDICAL CENTER | Age: 44
End: 2019-12-10

## 2019-12-10 NOTE — TELEPHONE ENCOUNTER
Left a message for patient to call back at [phone].     Shawna Hernandes  [facility] Assistant

## 2020-07-23 ENCOUNTER — OFFICE VISIT (OUTPATIENT)
Dept: URGENT CARE | Facility: CLINIC | Age: 45
End: 2020-07-23
Payer: COMMERCIAL

## 2020-07-23 VITALS
DIASTOLIC BLOOD PRESSURE: 74 MMHG | WEIGHT: 144.6 LBS | HEART RATE: 64 BPM | RESPIRATION RATE: 18 BRPM | OXYGEN SATURATION: 97 % | SYSTOLIC BLOOD PRESSURE: 118 MMHG | HEIGHT: 62 IN | TEMPERATURE: 98.3 F | BODY MASS INDEX: 26.61 KG/M2

## 2020-07-23 DIAGNOSIS — S13.4XXA WHIPLASH INJURY TO NECK, INITIAL ENCOUNTER: ICD-10-CM
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
## 2020-07-23 DIAGNOSIS — S39.012A STRAIN OF LUMBAR REGION, INITIAL ENCOUNTER: ICD-10-CM

## 2020-07-23 PROCEDURE — 99214 OFFICE O/P EST MOD 30 MIN: CPT | Performed by: PHYSICIAN ASSISTANT

## 2020-07-23 RX ORDER — CYCLOBENZAPRINE HCL 5 MG
5-10 TABLET ORAL 3 TIMES DAILY PRN
Qty: 30 TAB | Refills: 0 | Status: SHIPPED | OUTPATIENT
Start: 2020-07-23 | End: 2020-08-13

## 2020-07-23 RX ORDER — IBUPROFEN 600 MG/1
600 TABLET ORAL EVERY 6 HOURS PRN
Qty: 30 TAB | Refills: 0 | Status: SHIPPED | OUTPATIENT
Start: 2020-07-23 | End: 2020-08-13

## 2020-07-23 ASSESSMENT — ENCOUNTER SYMPTOMS
CHILLS: 0
CARDIOVASCULAR NEGATIVE: 1
BACK PAIN: 1
FEVER: 0
NECK PAIN: 1
ABDOMINAL PAIN: 0
FATIGUE: 0
CONSTITUTIONAL NEGATIVE: 1
NEUROLOGICAL NEGATIVE: 1
RESPIRATORY NEGATIVE: 1
EYES NEGATIVE: 1
WEAKNESS: 0
CHANGE IN BOWEL HABIT: 0
HEADACHES: 0
GASTROINTESTINAL NEGATIVE: 1
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
## 2020-08-13 ENCOUNTER — HOSPITAL ENCOUNTER (OUTPATIENT)
Facility: MEDICAL CENTER | Age: 45
End: 2020-08-13
Attending: NURSE PRACTITIONER
Payer: COMMERCIAL

## 2020-08-13 ENCOUNTER — OFFICE VISIT (OUTPATIENT)
Dept: MEDICAL GROUP | Facility: MEDICAL CENTER | Age: 45
End: 2020-08-13
Payer: COMMERCIAL

## 2020-08-13 VITALS
OXYGEN SATURATION: 96 % | HEART RATE: 71 BPM | DIASTOLIC BLOOD PRESSURE: 65 MMHG | SYSTOLIC BLOOD PRESSURE: 120 MMHG | TEMPERATURE: 98.7 F

## 2020-08-13 DIAGNOSIS — Z12.72 SMEAR, VAGINAL, AS PART OF ROUTINE GYNECOLOGICAL EXAMINATION: ICD-10-CM

## 2020-08-13 DIAGNOSIS — Z12.4 ROUTINE CERVICAL SMEAR: ICD-10-CM

## 2020-08-13 DIAGNOSIS — L98.9 SKIN LESION: ICD-10-CM

## 2020-08-13 DIAGNOSIS — Z12.31 ENCOUNTER FOR SCREENING MAMMOGRAM FOR MALIGNANT NEOPLASM OF BREAST: ICD-10-CM

## 2020-08-13 DIAGNOSIS — E03.9 ACQUIRED HYPOTHYROIDISM: ICD-10-CM

## 2020-08-13 DIAGNOSIS — Z01.419 SMEAR, VAGINAL, AS PART OF ROUTINE GYNECOLOGICAL EXAMINATION: ICD-10-CM

## 2020-08-13 DIAGNOSIS — D17.21 LIPOMA OF RIGHT UPPER EXTREMITY: ICD-10-CM

## 2020-08-13 PROCEDURE — 88175 CYTOPATH C/V AUTO FLUID REDO: CPT

## 2020-08-13 PROCEDURE — 87624 HPV HI-RISK TYP POOLED RSLT: CPT

## 2020-08-13 PROCEDURE — 99396 PREV VISIT EST AGE 40-64: CPT | Performed by: NURSE PRACTITIONER

## 2020-08-13 RX ORDER — LEVOTHYROXINE SODIUM 0.05 MG/1
TABLET ORAL
Qty: 30 TAB | Refills: 0 | Status: SHIPPED | OUTPATIENT
Start: 2020-08-13 | End: 2020-09-22 | Stop reason: SDUPTHER

## 2020-08-13 NOTE — PROGRESS NOTES
Subjective:      Koki Arguello is a 44 y.o. female who presents with Annual Exam and Gynecologic Exam            HPI  Seen in f/u for pap smear.  She is feeling well.  She is due updated lab.  Will do soon.  She is due mammo  She is stable on her synthroid.  Needs refills.  Taking meds approp.  She has a lipoma on rt shoulder that is getting bigger.  It is getting painful.  Will refer to derm for poss removal  Has a new sl irreg, mildly raised black 1 mm lesion rt axilla.    Patient Active Problem List    Diagnosis Date Noted   • Trigeminal neuralgia of left side of face 10/13/2017   • TMJ pain dysfunction syndrome 06/26/2017   • GERD (gastroesophageal reflux disease) 01/13/2016   • Allergic rhinitis 02/06/2013   • Tinnitus 11/08/2010   • Proteinuria 03/24/2010   • Hypothyroidism 03/19/2010   • Scoliosis    • Family history of breast cancer in mother 01/06/2010     Current Outpatient Medications   Medication Sig Dispense Refill   • levothyroxine (SYNTHROID) 50 MCG Tab TAKE ONE TABLET BY MOUTH EVERY MORNING ON AN EMPTY STOMACH 30 Tab 0   • cyanocobalamin (VITAMIN B-12) 100 MCG Tab Take 400 mcg by mouth every day.     • Multiple Vitamins-Minerals (MULTIVITAL) TABS Take 1 Tab by mouth every day.     • Cholecalciferol (VITAMIN D) 1000 UNIT CAPS Take 2 Caps by mouth every day.       No current facility-administered medications for this visit.      Amoxicillin    ROS  Review of Systems   Constitutional: Negative.  Negative for fever, chills, weight loss, malaise/fatigue and diaphoresis.   HENT: Negative.  Negative for hearing loss, ear pain, nosebleeds, congestion, sore throat, neck pain, tinnitus and ear discharge.    Eyes: Negative.  Negative for blurred vision, double vision, photophobia, pain, discharge and redness.   Respiratory: Negative.  Negative for cough, hemoptysis, sputum production, shortness of breath, wheezing and stridor.    Cardiovascular: Negative.  Negative for chest pain, palpitations,  orthopnea, claudication, leg swelling and PND.   Gastrointestinal: Negative.  Negative for heartburn, nausea, vomiting, abdominal pain, diarrhea, constipation, blood in stool and melena.   Genitourinary: Negative.  Negative for dysuria, urgency, frequency, incontinence, hematuria and flank pain.   Musculoskeletal: Negative.  Negative for myalgias, back pain, joint pain and falls.   Skin: Negative.  Negative for itching and rash.   Neurological: Negative.  Negative for dizziness, tingling, tremors, sensory change, speech change, focal weakness, seizures, loss of consciousness, weakness and headaches.   Endo/Heme/Allergies: Negative.  Negative for environmental allergies and polydipsia. Does not bruise/bleed easily.   Psychiatric/Behavioral: Negative.  Negative for depression, suicidal ideas, hallucinations, memory loss and substance abuse. The patient is not nervous/anxious and does not have insomnia.    All other systems reviewed and are negative.         Objective:     /65 (BP Location: Right arm, Patient Position: Sitting)   Pulse 71   Temp 37.1 °C (98.7 °F)   SpO2 96%      Physical Exam  Physical Exam   Vitals reviewed.  Constitutional: oriented to person, place, and time. appears well-developed and well-nourished. No distress.   HENT: Head: Normocephalic and atraumatic. Bilateral tympanic membranes wnl w/o bulging.  Right Ear: External ear normal. Left Ear: External ear normal. Nose: Nose normal.  Mouth/Throat: Oropharynx is clear and moist. No oropharyngeal exudate. emelina tm wnl. Eyes: Conjunctivae and EOM are normal. Pupils are equal, round, and reactive to light. Right eye exhibits no discharge. Left eye exhibits no discharge. No scleral icterus.    Neck: Normal range of motion. Neck supple. No JVD present.   Cardiovascular: Normal rate, regular rhythm, normal heart sounds and intact distal pulses.  Exam reveals no gallop and no friction rub.  No murmur heard.  No carotid bruits   Pulmonary/Chest:  Effort normal and breath sounds normal. No stridor. No respiratory distress. no wheezes or rales. exhibits no tenderness.   Abdominal: Soft. Bowel sounds are normal. exhibits no distension and no mass. No tenderness. no rebound and no guarding.   Musculoskeletal: Normal range of motion. exhibits no edema or tenderness.  emelina pedal pulses 2+.  Lymphadenopathy:  no cervical or supraclavicular adenopathy.   Neurological: alert and oriented to person, place, and time. has normal reflexes. displays normal reflexes. No cranial nerve deficit. exhibits normal muscle tone. Coordination normal.   Skin: Skin is warm and dry. No rash noted. no diaphoresis. No erythema. No pallor.   Psychiatric: normal mood and affect. behavior is normal.   SUBJECTIVE: 44 y.o. female for annual routine pap and checkup.  Gyn History:   Last Pap: 2017  Contraception: vasectomy  H/O Abnormal Pap no  H/O STI none  Current partner: monogamous  Lmp: 2020  ROS:  Menses every month with no excessive cramping or bleeding.  No analgesics required during menses.  No pelvic pain, vaginal discharge or dyspareunia.  No breast tenderness, mass, nipple discharge, changes in size or contour, or abnormal cyclic discomfort.   Breast Exam:Performed with instruction during examination. Breasts equal size and shape.  No axillary lymphadenopathy, no skin changes, no dominant masses. No nipple retraction  Pelvic Exam - Sure Path Pap obtained and specimen sent to lab. Normal external genitalia with no lesions. Normal vaginal mucosa with normal rugation. Cervix has no visible lesions. No cervical motion tenderness. Uterus is normal sized with no masses. No adnexal tenderness or enlargement appreciated.       Assessment/Plan:     1. Routine cervical smear  THINPREP PAP WITH HPV    f/u with pt with pap smear results.  she will do lab and f/u 1 mo for review   2. Smear, vaginal, as part of routine gynecological examination  THINPREP PAP WITH HPV   3. Acquired  hypothyroidism  levothyroxine (SYNTHROID) 50 MCG Tab    check tsh, t4.  refill synthroid   4. Skin lesion  REFERRAL TO DERMATOLOGY    suspicious skin lesion rt axilla.  refer derm   5. Lipoma of right upper extremity  REFERRAL TO PLASTIC SURGERY    increasing in size and getting painful.  refer plastics for removal   6. Encounter for screening mammogram for malignant neoplasm of breast  MA-SCREENING MAMMO BILAT W/CAD

## 2020-08-14 DIAGNOSIS — Z12.72 SMEAR, VAGINAL, AS PART OF ROUTINE GYNECOLOGICAL EXAMINATION: ICD-10-CM

## 2020-08-14 DIAGNOSIS — Z12.4 ROUTINE CERVICAL SMEAR: ICD-10-CM

## 2020-08-14 DIAGNOSIS — Z01.419 SMEAR, VAGINAL, AS PART OF ROUTINE GYNECOLOGICAL EXAMINATION: ICD-10-CM

## 2020-09-18 LAB
25(OH)D3+25(OH)D2 SERPL-MCNC: 25.2 NG/ML (ref 30–100)
ALBUMIN SERPL-MCNC: 4.4 G/DL (ref 3.8–4.8)
ALBUMIN/CREAT UR: 4 MG/G CREAT (ref 0–29)
ALBUMIN/GLOB SERPL: 1.7 {RATIO} (ref 1.2–2.2)
ALP SERPL-CCNC: 69 IU/L (ref 39–117)
ALT SERPL-CCNC: 13 IU/L (ref 0–32)
AST SERPL-CCNC: 19 IU/L (ref 0–40)
BILIRUB SERPL-MCNC: 0.5 MG/DL (ref 0–1.2)
BUN SERPL-MCNC: 11 MG/DL (ref 6–24)
BUN/CREAT SERPL: 13 (ref 9–23)
CALCIUM SERPL-MCNC: 9.4 MG/DL (ref 8.7–10.2)
CHLORIDE SERPL-SCNC: 106 MMOL/L (ref 96–106)
CHOLEST SERPL-MCNC: 194 MG/DL (ref 100–199)
CO2 SERPL-SCNC: 20 MMOL/L (ref 20–29)
CREAT SERPL-MCNC: 0.87 MG/DL (ref 0.57–1)
CREAT UR-MCNC: 125.1 MG/DL
GLOBULIN SER CALC-MCNC: 2.6 G/DL (ref 1.5–4.5)
GLUCOSE SERPL-MCNC: 82 MG/DL (ref 65–99)
HDLC SERPL-MCNC: 82 MG/DL
LABORATORY COMMENT REPORT: NORMAL
LDLC SERPL CALC-MCNC: 92 MG/DL (ref 0–99)
MICROALBUMIN UR-MCNC: 4.7 UG/ML
POTASSIUM SERPL-SCNC: 4.2 MMOL/L (ref 3.5–5.2)
PROT SERPL-MCNC: 7 G/DL (ref 6–8.5)
SODIUM SERPL-SCNC: 140 MMOL/L (ref 134–144)
T4 FREE SERPL-MCNC: 1.33 NG/DL (ref 0.82–1.77)
TRIGL SERPL-MCNC: 116 MG/DL (ref 0–149)
TSH SERPL DL<=0.005 MIU/L-ACNC: 5.25 UIU/ML (ref 0.45–4.5)
VLDLC SERPL CALC-MCNC: 20 MG/DL (ref 5–40)

## 2020-09-22 ENCOUNTER — OFFICE VISIT (OUTPATIENT)
Dept: MEDICAL GROUP | Facility: MEDICAL CENTER | Age: 45
End: 2020-09-22
Payer: COMMERCIAL

## 2020-09-22 VITALS
BODY MASS INDEX: 27.6 KG/M2 | SYSTOLIC BLOOD PRESSURE: 108 MMHG | HEIGHT: 62 IN | WEIGHT: 150 LBS | DIASTOLIC BLOOD PRESSURE: 70 MMHG | TEMPERATURE: 99 F | OXYGEN SATURATION: 98 % | HEART RATE: 74 BPM

## 2020-09-22 DIAGNOSIS — E03.9 ACQUIRED HYPOTHYROIDISM: ICD-10-CM

## 2020-09-22 DIAGNOSIS — R94.6 ABNORMAL THYROID FUNCTION TEST: ICD-10-CM

## 2020-09-22 DIAGNOSIS — E55.9 VITAMIN D DEFICIENCY: ICD-10-CM

## 2020-09-22 PROCEDURE — 99214 OFFICE O/P EST MOD 30 MIN: CPT | Performed by: NURSE PRACTITIONER

## 2020-09-22 RX ORDER — LEVOTHYROXINE SODIUM 0.05 MG/1
TABLET ORAL
Qty: 102 TAB | Refills: 0 | Status: SHIPPED | OUTPATIENT
Start: 2020-09-22 | End: 2021-01-18 | Stop reason: SDUPTHER

## 2020-09-22 ASSESSMENT — PATIENT HEALTH QUESTIONNAIRE - PHQ9: CLINICAL INTERPRETATION OF PHQ2 SCORE: 0

## 2020-09-22 NOTE — NON-PROVIDER
Subjective:     Koki Arguello is a 44 y.o. female who presents with hypothyroidism.    HPI:   Her free T4 is normal at 1.33 but her TSH is elevated at 5.250. It was at  3.140 last December 2019. She also reports increased skin dryness and weight gain but no fatigue.    Her Vit D level is still low at 25.2 but improving with daily OTC Vit D supplement.     Her CMP is WNL without signs of proteinuria or electrolyte imbalance. She is exercising more and drinking fluids. Her LP is WNL with triglycerides at 116; HDL at 82, and LDL at 92.   Her microalb/creatinine ratio is at 4 and WNL.     She has an appointment with a dermatologist in December for a nevus on right axilla.      She has a mammogram scheduled for next month.       Patient Active Problem List    Diagnosis Date Noted   • Trigeminal neuralgia of left side of face 10/13/2017   • TMJ pain dysfunction syndrome 06/26/2017   • GERD (gastroesophageal reflux disease) 01/13/2016   • Allergic rhinitis 02/06/2013   • Tinnitus 11/08/2010   • Proteinuria 03/24/2010   • Hypothyroidism 03/19/2010   • Scoliosis    • Family history of breast cancer in mother 01/06/2010       Current medicines (including changes today)  Current Outpatient Medications   Medication Sig Dispense Refill   • levothyroxine (SYNTHROID) 50 MCG Tab Take 100 mcg once a week and take 50 mcg on other days. 102 Tab 0   • cyanocobalamin (VITAMIN B-12) 100 MCG Tab Take 400 mcg by mouth every day.     • Multiple Vitamins-Minerals (MULTIVITAL) TABS Take 1 Tab by mouth every day.     • Cholecalciferol (VITAMIN D) 1000 UNIT CAPS Take 2 Caps by mouth every day.       No current facility-administered medications for this visit.        Allergies   Allergen Reactions   • Amoxicillin Itching and Unspecified     Tight bumpy lips       ROS  Constitutional: Negative. Negative for fever, chills, weight loss, malaise/fatigue and diaphoresis.   HENT: Negative. Negative for hearing loss, ear pain, nosebleeds,  "congestion, sore throat, neck pain, tinnitus and ear discharge.   Respiratory: Negative. Negative for cough, hemoptysis, sputum production, shortness of breath, wheezing and stridor.   Cardiovascular: Negative. Negative for chest pain, palpitations, orthopnea, claudication, leg swelling and PND.   Gastrointestinal: Denies nausea, vomiting, diarrhea, constipation, heartburn, melena or hematochezia.  Genitourinary: Denies dysuria, hematuria, urinary incontinence, frequency or urgency.        Objective:     /70 (BP Location: Right arm, Patient Position: Sitting)   Pulse 74   Temp 37.2 °C (99 °F) (Temporal)   Ht 1.575 m (5' 2\")   Wt 68 kg (150 lb)   SpO2 98%  Body mass index is 27.44 kg/m².    Physical Exam:  Vitals reviewed.  Constitutional: Oriented to person, place, and time. appears well-developed and well-nourished. No distress.   Cardiovascular: Normal rate, regular rhythm, normal heart sounds and intact distal pulses. Exam reveals no gallop and no friction rub. No murmur heard. No carotid bruits.   Pulmonary/Chest: Effort normal and breath sounds normal. No stridor. No respiratory distress. no wheezes or rales. exhibits no tenderness.   Musculoskeletal: Normal range of motion. exhibits no edema. emelina pedal pulses 2+.  Lymphadenopathy: No cervical or supraclavicular adenopathy.   Neurological: Alert and oriented to person, place, and time. exhibits normal muscle tone.  Skin: Skin is warm and dry. No diaphoresis.   Psychiatric: Normal mood and affect. Behavior is normal.      Assessment and Plan:     The following treatment plan was discussed:    1. Acquired hypothyroidism  TSH+FREE T4    levothyroxine (SYNTHROID) 50 MCG Tab    elevated TSH, adjust medication; check tsh, t4 in 2m .  refill synthroid with 100 mcg once a week and 50 mcg on other 6 days.   2. Vitamin D deficiency      Take Vit D 5000 units 2x/week and 2000 units on other days; will recheck level in a year and review results with patient   3. " Abnormal thyroid function test  TSH+FREE T4    levothyroxine (SYNTHROID) 50 MCG Tab    repeat TSH, fT4 in 2m; adjust medication dose     4. Recheck TSH and FT4 in 2m and discuss results. May consider readjusting meds depending on lab. Otherwise, follow up in a year for routine exam.    Followup: Return in about 1 year (around 9/22/2021).

## 2020-12-08 ENCOUNTER — TELEMEDICINE (OUTPATIENT)
Dept: TELEHEALTH | Facility: TELEMEDICINE | Age: 45
End: 2020-12-08
Payer: COMMERCIAL

## 2020-12-08 VITALS — HEIGHT: 62 IN | BODY MASS INDEX: 26.5 KG/M2 | WEIGHT: 144 LBS

## 2020-12-08 DIAGNOSIS — Z20.822 CLOSE EXPOSURE TO COVID-19 VIRUS: ICD-10-CM

## 2020-12-08 DIAGNOSIS — Z20.822 SUSPECTED COVID-19 VIRUS INFECTION: ICD-10-CM

## 2020-12-08 PROCEDURE — 99213 OFFICE O/P EST LOW 20 MIN: CPT | Mod: 95,CR,CS | Performed by: PHYSICIAN ASSISTANT

## 2020-12-08 ASSESSMENT — ENCOUNTER SYMPTOMS
CONSTIPATION: 0
ABDOMINAL PAIN: 0
CHILLS: 0
VOMITING: 0
DIARRHEA: 0
FEVER: 0
COUGH: 1
SORE THROAT: 0
SHORTNESS OF BREATH: 0
NAUSEA: 0
HEADACHES: 1

## 2020-12-08 NOTE — PROGRESS NOTES
Telemedicine Visit: Established Patient     This encounter was conducted via Zoom.   Verbal consent was obtained. Patient's identity was verified.    Subjective:   CC: No chief complaint on file.    Koki Arguello is a 44 y.o. female presenting for evaluation and management of:    Sinusitis  This is a new problem. Episode onset: 3 days. The problem is unchanged. There has been no fever. Associated symptoms include congestion, coughing (mild ), ear pain and headaches. Pertinent negatives include no chills, shortness of breath or sore throat. (Myalgia ) Treatments tried: theraflu, zinc, vitamins       dx with covid last week     Review of Systems   Constitutional: Positive for malaise/fatigue. Negative for chills and fever.   HENT: Positive for congestion and ear pain. Negative for sore throat.    Respiratory: Positive for cough (mild ). Negative for shortness of breath.    Gastrointestinal: Negative for abdominal pain, constipation, diarrhea, nausea and vomiting.   Neurological: Positive for headaches.   All other systems reviewed and are negative.        Allergies   Allergen Reactions   • Amoxicillin Itching and Unspecified     Tight bumpy lips       Current medicines (including changes today)  Current Outpatient Medications   Medication Sig Dispense Refill   • levothyroxine (SYNTHROID) 50 MCG Tab Take 100 mcg once a week and take 50 mcg on other days. 102 Tab 0   • cyanocobalamin (VITAMIN B-12) 100 MCG Tab Take 400 mcg by mouth every day.     • Multiple Vitamins-Minerals (MULTIVITAL) TABS Take 1 Tab by mouth every day.     • Cholecalciferol (VITAMIN D) 1000 UNIT CAPS Take 2 Caps by mouth every day.       No current facility-administered medications for this visit.        Patient Active Problem List    Diagnosis Date Noted   • Trigeminal neuralgia of left side of face 10/13/2017   • TMJ pain dysfunction syndrome 06/26/2017   • GERD (gastroesophageal reflux disease) 01/13/2016   • Allergic rhinitis  "02/06/2013   • Tinnitus 11/08/2010   • Proteinuria 03/24/2010   • Hypothyroidism 03/19/2010   • Scoliosis    • Family history of breast cancer in mother 01/06/2010       Family History   Problem Relation Age of Onset   • Cancer Mother         breast   • Heart Disease Father    • Diabetes Maternal Grandmother    • Hypertension Maternal Grandmother    • Diabetes Paternal Grandmother        PMH: has a past medical history of Allergy, Family history of breast cancer in mother, GERD (gastroesophageal reflux disease), Hypothyroidism, Proteinuria, Scoliosis, Temporomandibular joint-pain-dysfunction syndrome (TMJ), Tinnitus, and Trigeminal neuralgia of left side of face.  Surgical Hx:   Past Surgical History:   Procedure Laterality Date   • GANGLION EXCISION  9/22/08    Performed by LYNETTE HUBER at SURGERY SAME DAY [facility] ORS   • GANGLION EXCISION      rt wrist      Social hx:   Social History     Tobacco Use   • Smoking status: Never Smoker   • Smokeless tobacco: Never Used   Substance Use Topics   • Alcohol use: Yes     Alcohol/week: 0.0 oz     Comment: occasional   • Drug use: No               Objective:   Vitals obtained by patient:  Ht 1.575 m (5' 2\")   Wt 65.3 kg (144 lb)   BMI 26.34 kg/m²     Physical Exam:  Constitutional: Alert, no distress, well-groomed.  Skin: No rashes in visible areas.  Eye: Round. Conjunctiva clear, lids normal. No icterus.   ENMT: Lips pink without lesions, good dentition, moist mucous membranes. Phonation normal.  Neck: No masses, no thyromegaly. Moves freely without pain.  CV: Pulse as reported by patient  Respiratory: Unlabored respiratory effort, no cough or audible wheeze  Psych: Alert and oriented x3, normal affect and mood.       Assessment and Plan:   The following treatment plan was discussed:     1. Suspected COVID-19 virus infection  - COVID/SARS COV-2 PCR; Future    2. Close exposure to COVID-19 virus        Supportive care reviewed. Monitor sx closely. Alternate " tylenol/motrin. The pt will be notified of final covid test results via [employer]. Isolation procedure reviewed.       If symptoms worsen or persist patient can return to clinic for reevaluation.  Red flags and emergency room precautions discussed.  Patient confirmed understanding of information.      Follow-up: Return if symptoms worsen or fail to improve.

## 2020-12-09 ENCOUNTER — HOSPITAL ENCOUNTER (OUTPATIENT)
Dept: LAB | Facility: MEDICAL CENTER | Age: 45
End: 2020-12-09
Attending: PHYSICIAN ASSISTANT
Payer: COMMERCIAL

## 2020-12-09 ENCOUNTER — APPOINTMENT (OUTPATIENT)
Dept: DERMATOLOGY | Facility: IMAGING CENTER | Age: 45
End: 2020-12-09
Payer: COMMERCIAL

## 2020-12-09 LAB — COVID ORDER STATUS COVID19: NORMAL

## 2020-12-09 PROCEDURE — C9803 HOPD COVID-19 SPEC COLLECT: HCPCS

## 2020-12-09 PROCEDURE — U0003 INFECTIOUS AGENT DETECTION BY NUCLEIC ACID (DNA OR RNA); SEVERE ACUTE RESPIRATORY SYNDROME CORONAVIRUS 2 (SARS-COV-2) (CORONAVIRUS DISEASE [COVID-19]), AMPLIFIED PROBE TECHNIQUE, MAKING USE OF HIGH THROUGHPUT TECHNOLOGIES AS DESCRIBED BY CMS-2020-01-R: HCPCS

## 2020-12-10 LAB
SARS-COV-2 RNA RESP QL NAA+PROBE: NOTDETECTED
SPECIMEN SOURCE: NORMAL

## 2020-12-21 ENCOUNTER — OFFICE VISIT (OUTPATIENT)
Dept: DERMATOLOGY | Facility: IMAGING CENTER | Age: 45
End: 2020-12-21
Payer: COMMERCIAL

## 2020-12-21 VITALS — WEIGHT: 145 LBS | HEIGHT: 62 IN | BODY MASS INDEX: 26.68 KG/M2

## 2020-12-21 DIAGNOSIS — D22.9 NEVUS: ICD-10-CM

## 2020-12-21 PROCEDURE — 99202 OFFICE O/P NEW SF 15 MIN: CPT | Performed by: NURSE PRACTITIONER

## 2020-12-21 ASSESSMENT — ENCOUNTER SYMPTOMS
CHILLS: 0
FEVER: 0

## 2020-12-21 NOTE — PROGRESS NOTES
Dermatology New Patient Visit    Chief Complaint   Patient presents with   • Skin Lesion   • Establish Care       Subjective:     HPI:   Koki Arguello is a 44 y.o. female presenting for    HPI: skin lesion   Location: underneath arm pit   Time present: 1 year   Painful lesion: No  Itching lesion: No  Enlarging lesion: No  Anything make it better or worse?no     History of skin cancer: No  History of biopsies:No  History of blistering/severe sunburns:Yes, Details:   Family history of skin cancer:No  Family history of atypical moles:No    Tobacco use: Never  Alcohol use:DRINKS: occasionally in social situations  Allergies:No          Past Medical History:   Diagnosis Date   • Allergy    • Family history of breast cancer in mother    • GERD (gastroesophageal reflux disease)    • Hypothyroidism    • Proteinuria     ? etiology.  renal us wnl   • Scoliosis    • Temporomandibular joint-pain-dysfunction syndrome (TMJ)    • Tinnitus    • Trigeminal neuralgia of left side of face        Current Outpatient Medications on File Prior to Visit   Medication Sig Dispense Refill   • levothyroxine (SYNTHROID) 50 MCG Tab Take 100 mcg once a week and take 50 mcg on other days. 102 Tab 0   • cyanocobalamin (VITAMIN B-12) 100 MCG Tab Take 400 mcg by mouth every day.     • Multiple Vitamins-Minerals (MULTIVITAL) TABS Take 1 Tab by mouth every day.     • Cholecalciferol (VITAMIN D) 1000 UNIT CAPS Take 2 Caps by mouth every day.       No current facility-administered medications on file prior to visit.        Allergies   Allergen Reactions   • Amoxicillin Itching and Unspecified     Tight bumpy lips       Family History   Problem Relation Age of Onset   • Cancer Mother         breast   • Heart Disease Father    • Diabetes Maternal Grandmother    • Hypertension Maternal Grandmother    • Diabetes Paternal Grandmother        Social History     Socioeconomic History   • Marital status:      Spouse name: Not on file   • Number  "of children: Not on file   • Years of education: Not on file   • Highest education level: Not on file   Occupational History   • Not on file   Social Needs   • Financial resource strain: Not on file   • Food insecurity     Worry: Not on file     Inability: Not on file   • Transportation needs     Medical: Not on file     Non-medical: Not on file   Tobacco Use   • Smoking status: Never Smoker   • Smokeless tobacco: Never Used   Substance and Sexual Activity   • Alcohol use: Yes     Alcohol/week: 0.0 oz     Comment: occasional   • Drug use: No   • Sexual activity: Not on file   Lifestyle   • Physical activity     Days per week: Not on file     Minutes per session: Not on file   • Stress: Not on file   Relationships   • Social connections     Talks on phone: Not on file     Gets together: Not on file     Attends [employer] service: Not on file     Active member of club or organization: Not on file     Attends meetings of clubs or organizations: Not on file     Relationship status: Not on file   • Intimate partner violence     Fear of current or ex partner: Not on file     Emotionally abused: Not on file     Physically abused: Not on file     Forced sexual activity: Not on file   Other Topics Concern   • Not on file   Social History Narrative   • Not on file       Review of Systems   Constitutional: Negative for chills and fever.   Skin: Negative for itching and rash.        Objective:     A focused cutaneous exam was completed including: face above mask, neck, chest, and right axilla with the following pertinent findings listed below. Remaining above-listed examined areas within normal limits / negative for rashes or lesions.      Ht 1.575 m (5' 2\")   Wt 65.8 kg (145 lb)     Physical Exam   Constitutional: She is oriented to person, place, and time and well-developed, well-nourished, and in no distress. No distress.   HENT:   Head: Normocephalic.   Pulmonary/Chest: Effort normal.   Neurological: She is alert and " oriented to person, place, and time.   Skin: Skin is warm and dry. No rash noted. No erythema.        Psychiatric: Mood normal.       DATA: none applicable to review    Assessment and Plan:     1. Nevus  Offered to biopsy or monitor for changes or to re-check in 6 weeks  Pt opted for 6 week re-check  Measurement taken and photo taken for comparison in 6 weeks  Advised she can call anytime and schedule bx if she decides this route instead.       Followup: Return for 6 week spot check or sooner for any changes to nevus.    JAMARI Riddle.

## 2021-01-14 ENCOUNTER — TELEPHONE (OUTPATIENT)
Dept: MEDICAL GROUP | Facility: MEDICAL CENTER | Age: 46
End: 2021-01-14

## 2021-01-14 LAB
25(OH)D3+25(OH)D2 SERPL-MCNC: 32.3 NG/ML (ref 30–100)
T4 FREE SERPL-MCNC: 1.25 NG/DL (ref 0.82–1.77)
TSH SERPL DL<=0.005 MIU/L-ACNC: 2.37 UIU/ML (ref 0.45–4.5)

## 2021-01-14 NOTE — TELEPHONE ENCOUNTER
----- Message from RONALD Borden sent at 1/14/2021  9:33 AM PST -----  Please have pt set appointment to review and discuss treatment for labs.

## 2021-01-14 NOTE — LETTER
January 14, 2021        Koki Arguello  [address] [facility] [address]        Dear Koki:    After careful review of your chart, we have noted you are due for a follow up appointment.  We request you call our office at [phone] at your earliest convenience and make an appointment.     We look forward to scheduling an appointment for you, so that we may provide you with the safest and most complete medical care.        If you have any questions or concerns, please don't hesitate to call.        Sincerely,        SIERAR Borden.    Electronically Signed

## 2021-01-18 ENCOUNTER — HOSPITAL ENCOUNTER (OUTPATIENT)
Dept: RADIOLOGY | Facility: MEDICAL CENTER | Age: 46
End: 2021-01-18
Attending: NURSE PRACTITIONER
Payer: COMMERCIAL

## 2021-01-18 DIAGNOSIS — R92.2 DENSE BREASTS: ICD-10-CM

## 2021-01-18 DIAGNOSIS — Z12.31 VISIT FOR SCREENING MAMMOGRAM: ICD-10-CM

## 2021-01-18 DIAGNOSIS — R92.30 DENSE BREASTS: ICD-10-CM

## 2021-01-18 DIAGNOSIS — E03.9 ACQUIRED HYPOTHYROIDISM: ICD-10-CM

## 2021-01-18 DIAGNOSIS — R94.6 ABNORMAL THYROID FUNCTION TEST: ICD-10-CM

## 2021-01-18 PROCEDURE — 77063 BREAST TOMOSYNTHESIS BI: CPT

## 2021-01-18 RX ORDER — LEVOTHYROXINE SODIUM 0.05 MG/1
TABLET ORAL
Qty: 102 TAB | Refills: 3 | Status: SHIPPED | OUTPATIENT
Start: 2021-01-18 | End: 2022-02-09

## 2021-02-09 ENCOUNTER — OFFICE VISIT (OUTPATIENT)
Dept: DERMATOLOGY | Facility: IMAGING CENTER | Age: 46
End: 2021-02-09
Payer: COMMERCIAL

## 2021-02-09 DIAGNOSIS — D48.5 NEOPLASM OF UNCERTAIN BEHAVIOR OF SKIN: ICD-10-CM

## 2021-02-09 PROCEDURE — 11104 PUNCH BX SKIN SINGLE LESION: CPT | Performed by: NURSE PRACTITIONER

## 2021-02-09 ASSESSMENT — ENCOUNTER SYMPTOMS
FEVER: 0
CHILLS: 0

## 2021-02-09 NOTE — PROGRESS NOTES
Dermatology New Patient Visit    Chief Complaint   Patient presents with   • Skin Lesion       Subjective:     HPI:   Koki Arguello is a 44 y.o. female presenting for biopsy     HPI: skin lesion   Location: underneath arm pit   Time present: 1 year   Painful lesion: No  Itching lesion: No  Enlarging lesion: No  Anything make it better or worse?no     History of skin cancer: No  History of biopsies:No  History of blistering/severe sunburns:Yes, Details:   Family history of skin cancer:No  Family history of atypical moles:No    Tobacco use: Never  Alcohol use:DRINKS: occasionally in social situations  Allergies:No          Past Medical History:   Diagnosis Date   • Allergy    • Family history of breast cancer in mother    • GERD (gastroesophageal reflux disease)    • Hypothyroidism    • Proteinuria     ? etiology.  renal us wnl   • Scoliosis    • Temporomandibular joint-pain-dysfunction syndrome (TMJ)    • Tinnitus    • Trigeminal neuralgia of left side of face        Current Outpatient Medications on File Prior to Visit   Medication Sig Dispense Refill   • levothyroxine (SYNTHROID) 50 MCG Tab TAKE 2 TABLETS (100MCG) BY MOUTH ONCE WEEKLY AND TAKE 1 TABLET (50MCG) ON OTHER DAYS 102 Tab 3   • levothyroxine (SYNTHROID) 50 MCG Tab Take 100 mcg once a week and take 50 mcg on other days. 102 Tab 3   • cyanocobalamin (VITAMIN B-12) 100 MCG Tab Take 400 mcg by mouth every day.     • Multiple Vitamins-Minerals (MULTIVITAL) TABS Take 1 Tab by mouth every day.     • Cholecalciferol (VITAMIN D) 1000 UNIT CAPS Take 2 Caps by mouth every day.       No current facility-administered medications on file prior to visit.        Allergies   Allergen Reactions   • Amoxicillin Itching and Unspecified     Tight bumpy lips       Family History   Problem Relation Age of Onset   • Cancer Mother         breast   • Heart Disease Father    • Diabetes Maternal Grandmother    • Hypertension Maternal Grandmother    • Diabetes Paternal  Grandmother        Social History     Socioeconomic History   • Marital status:      Spouse name: Not on file   • Number of children: Not on file   • Years of education: Not on file   • Highest education level: Not on file   Occupational History   • Not on file   Social Needs   • Financial resource strain: Not on file   • Food insecurity     Worry: Not on file     Inability: Not on file   • Transportation needs     Medical: Not on file     Non-medical: Not on file   Tobacco Use   • Smoking status: Never Smoker   • Smokeless tobacco: Never Used   Substance and Sexual Activity   • Alcohol use: Yes     Alcohol/week: 0.0 oz     Comment: occasional   • Drug use: No   • Sexual activity: Not on file   Lifestyle   • Physical activity     Days per week: Not on file     Minutes per session: Not on file   • Stress: Not on file   Relationships   • Social connections     Talks on phone: Not on file     Gets together: Not on file     Attends [employer] service: Not on file     Active member of club or organization: Not on file     Attends meetings of clubs or organizations: Not on file     Relationship status: Not on file   • Intimate partner violence     Fear of current or ex partner: Not on file     Emotionally abused: Not on file     Physically abused: Not on file     Forced sexual activity: Not on file   Other Topics Concern   • Not on file   Social History Narrative   • Not on file       Review of Systems   Constitutional: Negative for chills and fever.   Skin: Negative for itching and rash.        Objective:     A focused cutaneous exam was completed including: face above mask, neck, chest, and right axilla with the following pertinent findings listed below. Remaining above-listed examined areas within normal limits / negative for rashes or lesions.      There were no vitals taken for this visit.    Physical Exam   Constitutional: She is oriented to person, place, and time and well-developed, well-nourished, and in no  distress. No distress.   HENT:   Head: Normocephalic.   Pulmonary/Chest: Effort normal.   Neurological: She is alert and oriented to person, place, and time.   Skin: Skin is warm and dry. No rash noted. No erythema.        Psychiatric: Mood normal.       DATA: none applicable to review    Assessment and Plan:     1. Neoplasm of uncertain behavior of skin  Procedure Note   Procedure: Biopsy by punch technique  Location: right axilla  Preoperative diagnosis: nevus. R/o atypia  Risks, benefits and alternatives of procedure discussed and written informed consent obtained. Time out completed. Area of biopsy prepped with alcohol. Anesthesia with 1% lidocaine with epinephrine administered with a 30 gauge needle. 3  mm punch biopsy of site performed. Hemostasis achieved with pressure and 4.0 prolene sutures. Vaseline applied to wound with bandage. Patient tolerated procedure well, and there were no complications.  The pathology specimen was sent to the lab via the staff.  Wound care was discussed with the patient.         I have performed a physical exam and reviewed and updated ROS and Plan today (2/9/2021). In review of dermatology visit (12/21/2020), there are no changes except as documented above.       Followup: Return for pending biopsy results.    JAMARI Riddle.

## 2021-02-16 ENCOUNTER — TELEPHONE (OUTPATIENT)
Dept: DERMATOLOGY | Facility: IMAGING CENTER | Age: 46
End: 2021-02-16

## 2021-02-16 ENCOUNTER — NON-PROVIDER VISIT (OUTPATIENT)
Dept: DERMATOLOGY | Facility: IMAGING CENTER | Age: 46
End: 2021-02-16

## 2021-02-16 NOTE — TELEPHONE ENCOUNTER
Per Vandana Roth, APRN, Patient notified of pathology results from 2/9/21 in clinic today at appointment for suture removal .  Patient expressed understanding and had no further questions.  Patient was advised to follow up in clinic for MARCELLE next 3-4 mos or sooner if spot grows back.

## 2021-02-16 NOTE — TELEPHONE ENCOUNTER
Phone Number Called: [phone] (home)     Call outcome: Pt had identifier, left verbal message about results    To call back if any questions occur.      Message: *Results abnormal. fv at [facility] yearly or return if spot comes back. Dpath scanned in

## 2021-02-16 NOTE — NON-PROVIDER
Koki Arguello is a 45 y.o. female here for a Non-Provider Visit for Suture Removal.    Sutures were placed by JOB Paz on date: 2/9/21  Skin is healed: Yes  Provider notified if skin is not healed, or if there is redness, heat, pain, or drainage from incision: N\A  Sutures removed.   Mastisol and steristips are placed: No    Advised to use emollient (vaseline, aquaphor, etc.) as needed, avoid peroxide and antibiotic ointment to reduce irritation.     Path report has been reviewed by provider.  Path report has reviewed with patient.

## 2021-02-17 ENCOUNTER — TELEPHONE (OUTPATIENT)
Dept: MEDICAL GROUP | Facility: MEDICAL CENTER | Age: 46
End: 2021-02-17

## 2021-02-17 ENCOUNTER — APPOINTMENT (OUTPATIENT)
Dept: DERMATOLOGY | Facility: IMAGING CENTER | Age: 46
End: 2021-02-17
Payer: COMMERCIAL

## 2021-02-17 NOTE — TELEPHONE ENCOUNTER
ESTABLISHED PATIENT PRE-VISIT PLANNING     Patient was NOT contacted to complete PVP.     Note: Patient will not be contacted if there is no indication to call.     1.  Reviewed notes from the last few office visits within the medical group: Yes    2.  If any orders were placed at last visit or intended to be done for this visit (i.e. 6 mos follow-up), do we have Results/Consult Notes?         •  Labs - Labs ordered, completed on 01/12/2021 and results are in chart.  Note: If patient appointment is for lab review and patient did not complete labs, check with provider if OK to reschedule patient until labs completed.       •  Imaging - Imaging ordered, NOT completed       •  Referrals - No referrals were ordered at last office visit.    3. Is this appointment scheduled as a Hospital Follow-Up? No    4.  Immunizations were updated in Epic using Reconcile Outside Information activity? Yes    5.  Patient is due for the following Health Maintenance Topics:   There are no preventive care reminders to display for this patient.      6.  AHA (Pulse[employer]) form printed for Provider? N/A    Outside information NOT reconciled using the [facility] feature. Per Tigist Juarez, the [facility] feature is down as of 02/09/2021 at 2:00pm. Will reconcile outside information at a later date.

## 2021-02-23 ENCOUNTER — OFFICE VISIT (OUTPATIENT)
Dept: MEDICAL GROUP | Facility: MEDICAL CENTER | Age: 46
End: 2021-02-23
Payer: COMMERCIAL

## 2021-02-23 VITALS
OXYGEN SATURATION: 96 % | WEIGHT: 148 LBS | HEIGHT: 62 IN | DIASTOLIC BLOOD PRESSURE: 70 MMHG | TEMPERATURE: 97.1 F | BODY MASS INDEX: 27.23 KG/M2 | SYSTOLIC BLOOD PRESSURE: 100 MMHG | HEART RATE: 83 BPM

## 2021-02-23 DIAGNOSIS — N64.4 BREAST PAIN, RIGHT: ICD-10-CM

## 2021-02-23 DIAGNOSIS — L98.9 SKIN LESION: ICD-10-CM

## 2021-02-23 PROCEDURE — 99214 OFFICE O/P EST MOD 30 MIN: CPT | Performed by: NURSE PRACTITIONER

## 2021-02-23 RX ORDER — GABAPENTIN 100 MG/1
100 CAPSULE ORAL NIGHTLY PRN
Qty: 30 CAPSULE | Refills: 0 | Status: SHIPPED
Start: 2021-02-23 | End: 2021-04-07

## 2021-02-23 ASSESSMENT — PATIENT HEALTH QUESTIONNAIRE - PHQ9: CLINICAL INTERPRETATION OF PHQ2 SCORE: 0

## 2021-02-23 NOTE — PROGRESS NOTES
Subjective:     Koki Arguello is a 45 y.o. female who presents with rt breast pain.    HPI:   Seen in f/u for rt breast pain.  She underwent bx by derm for rt axilla skin lesion.  The bx result showed dysplasia.  Derm recommended f/u 1 yr.   Pt is concerned d/t continuing to have burning pain in rt axilla radiating to rt breast.  1 mm round area of lesion removal healing well.  Large resolving bruise noted in axilla.  No dg, reddness or swelling noted.  Full ROM of rt arm.  No fever, chills or sweating.    Patient Active Problem List    Diagnosis Date Noted   • Trigeminal neuralgia of left side of face 10/13/2017   • TMJ pain dysfunction syndrome 06/26/2017   • GERD (gastroesophageal reflux disease) 01/13/2016   • Allergic rhinitis 02/06/2013   • Tinnitus 11/08/2010   • Proteinuria 03/24/2010   • Hypothyroidism 03/19/2010   • Scoliosis    • Family history of breast cancer in mother 01/06/2010       Current medicines (including changes today)  Current Outpatient Medications   Medication Sig Dispense Refill   • gabapentin (NEURONTIN) 100 MG Cap Take 1 capsule by mouth at bedtime as needed. 30 capsule 0   • levothyroxine (SYNTHROID) 50 MCG Tab Take 100 mcg once a week and take 50 mcg on other days. 102 Tab 3   • cyanocobalamin (VITAMIN B-12) 100 MCG Tab Take 400 mcg by mouth every day.     • Multiple Vitamins-Minerals (MULTIVITAL) TABS Take 1 Tab by mouth every day.     • Cholecalciferol (VITAMIN D) 1000 UNIT CAPS Take 2 Caps by mouth every day.       No current facility-administered medications for this visit.       Allergies   Allergen Reactions   • Amoxicillin Itching and Unspecified     Tight bumpy lips   • Milk [Dairy Food Allergy]        ROS  Constitutional: Negative. Negative for fever, chills, weight loss, malaise/fatigue and diaphoresis.   HENT: Negative. Negative for hearing loss, ear pain, nosebleeds, congestion, sore throat, neck pain, tinnitus and ear discharge.   Respiratory: Negative.  "Negative for cough, hemoptysis, sputum production, shortness of breath, wheezing and stridor.   Cardiovascular: Negative. Negative for chest pain, palpitations, orthopnea, claudication, leg swelling and PND.   Gastrointestinal: Denies nausea, vomiting, diarrhea, constipation, heartburn, melena or hematochezia.  Genitourinary: Denies dysuria, hematuria, urinary incontinence, frequency or urgency.        Objective:     /70 (BP Location: Right arm, Patient Position: Sitting)   Pulse 83   Temp 36.2 °C (97.1 °F) (Temporal)   Ht 1.575 m (5' 2\")   Wt 67.1 kg (148 lb)   SpO2 96%  Body mass index is 27.07 kg/m².    Physical Exam:  Vitals reviewed.  Constitutional: Oriented to person, place, and time. appears well-developed and well-nourished. No distress.   Cardiovascular: Normal rate, regular rhythm, normal heart sounds and intact distal pulses. Exam reveals no gallop and no friction rub. No murmur heard. No carotid bruits.   Pulmonary/Chest: Effort normal and breath sounds normal. No stridor. No respiratory distress. no wheezes or rales. exhibits no tenderness.   Musculoskeletal: Normal range of motion. exhibits no edema. emelina pedal pulses 2+.  Neurological: Alert and oriented to person, place, and time. exhibits normal muscle tone.  Skin: Skin is warm and dry. No diaphoresis. Rt axilla faded ecchymosis on mid to upper axilla.  Site of skin lesion removal 1 mm round w/o reddness, dg, swelling.    Psychiatric: Normal mood and affect. Behavior is normal.      Assessment and Plan:     The following treatment plan was discussed:    1. Breast pain, right  gabapentin (NEURONTIN) 100 MG Cap    she will try essential oils, ice, or neurontin.  f/u if sx worsen or do not resolve.  plan:  f/u 9/2021.  call for lab slip   2. Skin lesion      bx showed dysplasia.  derm recommended f/u 1 yr or sooner if lesion returns.         Followup: Return in about 7 months (around 9/23/2021).  "

## 2021-03-03 ENCOUNTER — HOSPITAL ENCOUNTER (OUTPATIENT)
Dept: RADIOLOGY | Facility: MEDICAL CENTER | Age: 46
End: 2021-03-03
Attending: NURSE PRACTITIONER
Payer: COMMERCIAL

## 2021-03-03 DIAGNOSIS — R92.30 DENSE BREASTS: ICD-10-CM

## 2021-03-03 DIAGNOSIS — R92.2 DENSE BREASTS: ICD-10-CM

## 2021-03-03 PROCEDURE — 76641 ULTRASOUND BREAST COMPLETE: CPT

## 2021-03-08 ENCOUNTER — APPOINTMENT (OUTPATIENT)
Dept: MEDICAL GROUP | Facility: MEDICAL CENTER | Age: 46
End: 2021-03-08
Payer: COMMERCIAL

## 2021-03-08 NOTE — PROGRESS NOTES
Had to reschedule patients appt per PCP request, called and left the patient a message to return the call to reschedule today appt. [phone]

## 2021-03-15 ENCOUNTER — TELEPHONE (OUTPATIENT)
Dept: MEDICAL GROUP | Facility: MEDICAL CENTER | Age: 46
End: 2021-03-15

## 2021-03-15 NOTE — TELEPHONE ENCOUNTER
ESTABLISHED PATIENT PRE-VISIT PLANNING     Patient was NOT contacted to complete PVP.     Note: Patient will not be contacted if there is no indication to call.     1.  Reviewed notes from the last few office visits within the medical group: Yes    2.  If any orders were placed at last visit or intended to be done for this visit (i.e. 6 mos follow-up), do we have Results/Consult Notes?         •  Labs - Labs were not ordered at last office visit.  Note: If patient appointment is for lab review and patient did not complete labs, check with provider if OK to reschedule patient until labs completed.       •  Imaging - Imaging ordered, completed and results are in chart.       •  Referrals - No referrals were ordered at last office visit.    3. Is this appointment scheduled as a Hospital Follow-Up? No    4.  Immunizations were updated in Epic using Reconcile Outside Information activity? Yes    5.  Patient is due for the following Health Maintenance Topics:   Health Maintenance Due   Topic Date Due   • COVID-19 Vaccine (2 of 2 - Moderna series) 03/06/2021     6.  AHA (Pulse8) form printed for Provider? N/A           Outside information NOT reconciled using the [employer] feature. Per Tigist Juarez, the [employer] feature is down as of 02/09/2021 at 2:00pm. Will reconcile outside information at a later date.

## 2021-04-07 ENCOUNTER — OFFICE VISIT (OUTPATIENT)
Dept: MEDICAL GROUP | Facility: MEDICAL CENTER | Age: 46
End: 2021-04-07
Payer: COMMERCIAL

## 2021-04-07 VITALS
TEMPERATURE: 97.5 F | HEART RATE: 79 BPM | BODY MASS INDEX: 26.31 KG/M2 | HEIGHT: 62 IN | SYSTOLIC BLOOD PRESSURE: 110 MMHG | DIASTOLIC BLOOD PRESSURE: 70 MMHG | WEIGHT: 143 LBS | OXYGEN SATURATION: 97 %

## 2021-04-07 DIAGNOSIS — R20.0 NUMBNESS AND TINGLING OF LEFT LEG: ICD-10-CM

## 2021-04-07 DIAGNOSIS — R20.2 NUMBNESS AND TINGLING OF LEFT LEG: ICD-10-CM

## 2021-04-07 DIAGNOSIS — H04.123 DRY EYE SYNDROME OF BOTH EYES: ICD-10-CM

## 2021-04-07 PROCEDURE — 99214 OFFICE O/P EST MOD 30 MIN: CPT | Performed by: NURSE PRACTITIONER

## 2021-04-07 RX ORDER — DICLOFENAC SODIUM 75 MG/1
75 TABLET, DELAYED RELEASE ORAL 2 TIMES DAILY
Qty: 30 TABLET | Refills: 0 | Status: SHIPPED
Start: 2021-04-07 | End: 2021-10-06

## 2021-04-07 RX ORDER — TIZANIDINE 4 MG/1
4 TABLET ORAL
Qty: 15 TABLET | Refills: 0 | Status: SHIPPED
Start: 2021-04-07 | End: 2021-10-06

## 2021-04-08 NOTE — PROGRESS NOTES
Subjective:     Koki Arguello is a 45 y.o. female who presents with eye dryness.    HPI:   Seen in f/u for eye dryness.  Over the last month she has had eye dryness.  She saw eye dr.  He gave her eye gtts.  That did not help.  She is using eye gtts.  They are helping some but not resolving.  She has tried teratears and gel.  That made it worse.  Eye dr told her she has dry eye syndrome.  She is also having sinus congestion.  Nasal dg in am mostly with waking up.   She is having left leg swelling.  Occurring at end of day.  occas swelling in hands also.  Worst sx is in left foot.  She is on low na diet.  She is eating mostly veggies.  She has lost 5 lbs.  It is swelling every day.  Started about 3 wks ago.  There is also numbness in leg.  Numbness occurs in intermittently.  Has a hx of low back pain.  No reddness or pain.       Patient Active Problem List    Diagnosis Date Noted   • Trigeminal neuralgia of left side of face 10/13/2017   • TMJ pain dysfunction syndrome 06/26/2017   • GERD (gastroesophageal reflux disease) 01/13/2016   • Allergic rhinitis 02/06/2013   • Tinnitus 11/08/2010   • Proteinuria 03/24/2010   • Hypothyroidism 03/19/2010   • Scoliosis    • Family history of breast cancer in mother 01/06/2010       Current medicines (including changes today)  Current Outpatient Medications   Medication Sig Dispense Refill   • diclofenac DR (VOLTAREN) 75 MG Tablet Delayed Response Take 1 tablet by mouth 2 times a day. 30 tablet 0   • tizanidine (ZANAFLEX) 4 MG Tab Take 1 tablet by mouth at bedtime. 15 tablet 0   • levothyroxine (SYNTHROID) 50 MCG Tab Take 100 mcg once a week and take 50 mcg on other days. 102 Tab 3   • cyanocobalamin (VITAMIN B-12) 100 MCG Tab Take 400 mcg by mouth every day.     • Multiple Vitamins-Minerals (MULTIVITAL) TABS Take 1 Tab by mouth every day.     • Cholecalciferol (VITAMIN D) 1000 UNIT CAPS Take 2 Caps by mouth every day.       No current facility-administered  "medications for this visit.       Allergies   Allergen Reactions   • Amoxicillin Itching and Unspecified     Tight bumpy lips   • Milk [Dairy Food Allergy]        ROS  Constitutional: Negative. Negative for fever, chills, weight loss, malaise/fatigue and diaphoresis.   HENT: Negative. Negative for hearing loss, ear pain, nosebleeds, congestion, sore throat, neck pain, tinnitus and ear discharge.   Respiratory: Negative. Negative for cough, hemoptysis, sputum production, shortness of breath, wheezing and stridor.   Cardiovascular: Negative. Negative for chest pain, palpitations, orthopnea, claudication, leg swelling and PND.   Gastrointestinal: Denies nausea, vomiting, diarrhea, constipation, heartburn, melena or hematochezia.  Genitourinary: Denies dysuria, hematuria, urinary incontinence, frequency or urgency.        Objective:     /70 (BP Location: Right arm, Patient Position: Sitting)   Pulse 79   Temp 36.4 °C (97.5 °F) (Temporal)   Ht 1.575 m (5' 2\")   Wt 64.9 kg (143 lb)   SpO2 97%  Body mass index is 26.16 kg/m².    Physical Exam:  Vitals reviewed.  Constitutional: Oriented to person, place, and time. appears well-developed and well-nourished. No distress.   Cardiovascular: Normal rate, regular rhythm, normal heart sounds and intact distal pulses. Exam reveals no gallop and no friction rub. No murmur heard. No carotid bruits.   Pulmonary/Chest: Effort normal and breath sounds normal. No stridor. No respiratory distress. no wheezes or rales. exhibits no tenderness.   Musculoskeletal: Normal range of motion. exhibits no edema. emelina pedal pulses 2+.  Lymphadenopathy: No cervical or supraclavicular adenopathy.   Neurological: Alert and oriented to person, place, and time. exhibits normal muscle tone.  Skin: Skin is warm and dry. No diaphoresis.   Psychiatric: Normal mood and affect. Behavior is normal.      Assessment and Plan:     The following treatment plan was discussed:    1. Numbness and tingling " of left leg  DX-LUMBAR SPINE-2 OR 3 VIEWS    diclofenac DR (VOLTAREN) 75 MG Tablet Delayed Response    tizanidine (ZANAFLEX) 4 MG Tab    she will continue home back exercises and ibuprofen.  if not better will do lumbar xray, try tizanidine and voltaren.     2. Dry eye syndrome of both eyes      continue f/u with ophth and artificial tears.  plan:  f/u 9/21 call for lab slip         Followup: Return in about 5 months (around 9/7/2021).

## 2021-09-14 ENCOUNTER — OFFICE VISIT (OUTPATIENT)
Dept: MEDICAL GROUP | Facility: MEDICAL CENTER | Age: 46
End: 2021-09-14
Payer: COMMERCIAL

## 2021-09-14 VITALS
DIASTOLIC BLOOD PRESSURE: 70 MMHG | OXYGEN SATURATION: 99 % | TEMPERATURE: 97.7 F | BODY MASS INDEX: 27.23 KG/M2 | HEIGHT: 62 IN | HEART RATE: 72 BPM | SYSTOLIC BLOOD PRESSURE: 110 MMHG | WEIGHT: 148 LBS

## 2021-09-14 DIAGNOSIS — G50.0 TRIGEMINAL NEURALGIA OF LEFT SIDE OF FACE: ICD-10-CM

## 2021-09-14 DIAGNOSIS — N95.9 PREMENOPAUSAL PATIENT: ICD-10-CM

## 2021-09-14 DIAGNOSIS — R80.8 OTHER PROTEINURIA: ICD-10-CM

## 2021-09-14 DIAGNOSIS — K21.9 GASTROESOPHAGEAL REFLUX DISEASE WITHOUT ESOPHAGITIS: ICD-10-CM

## 2021-09-14 DIAGNOSIS — E55.9 VITAMIN D DEFICIENCY: ICD-10-CM

## 2021-09-14 DIAGNOSIS — E03.9 HYPOTHYROIDISM, UNSPECIFIED TYPE: ICD-10-CM

## 2021-09-14 DIAGNOSIS — Z13.89 SCREENING FOR MULTIPLE CONDITIONS: ICD-10-CM

## 2021-09-14 DIAGNOSIS — Z00.00 ANNUAL PHYSICAL EXAM: ICD-10-CM

## 2021-09-14 DIAGNOSIS — Z13.220 SCREENING FOR HYPERLIPIDEMIA: ICD-10-CM

## 2021-09-14 DIAGNOSIS — E03.9 ACQUIRED HYPOTHYROIDISM: ICD-10-CM

## 2021-09-14 PROCEDURE — 99396 PREV VISIT EST AGE 40-64: CPT | Performed by: NURSE PRACTITIONER

## 2021-09-15 NOTE — PROGRESS NOTES
Subjective:     Koki Arguello is a 45 y.o. female who presents with PE.    HPI:   Seen in f/u for PE.  She is feeling well.  She is having perimenopausal sx.  she reports having a longer period this last month, it was heavy and light off and on for 2 wks.  This is unusual for her. She is usually very regular and tracks each cycle on an josh. The cycle was  8/26-9/9/21. She did have pubic pain towards the end of cycle for 3 days. Describes the pain as a sore, burning and achy. She also had increase breast tenderness throughout this cycle. She had intercourse with her  prior to this pain. The pain has now resolved. Denied dizziness.   She has Hypothyroidism.  She is stable on medications. taking appropriately.   no issues  She has a hx of GERD.  She is not on any meds for tx.  no recent episodes.     Trigeminal neuralgia, no recent episodes, will return when it returns.  This occurred in past.  No tx needed..  Due for her yearly preventative labs    Patient Active Problem List    Diagnosis Date Noted   • Trigeminal neuralgia of left side of face 10/13/2017   • TMJ pain dysfunction syndrome 06/26/2017   • GERD (gastroesophageal reflux disease) 01/13/2016   • Allergic rhinitis 02/06/2013   • Tinnitus 11/08/2010   • Proteinuria 03/24/2010   • Hypothyroidism 03/19/2010   • Scoliosis    • Family history of breast cancer in mother 01/06/2010       Current medicines (including changes today)  Current Outpatient Medications   Medication Sig Dispense Refill   • diclofenac DR (VOLTAREN) 75 MG Tablet Delayed Response Take 1 tablet by mouth 2 times a day. 30 tablet 0   • tizanidine (ZANAFLEX) 4 MG Tab Take 1 tablet by mouth at bedtime. 15 tablet 0   • levothyroxine (SYNTHROID) 50 MCG Tab Take 100 mcg once a week and take 50 mcg on other days. 102 Tab 3   • cyanocobalamin (VITAMIN B-12) 100 MCG Tab Take 400 mcg by mouth every day.     • Multiple Vitamins-Minerals (MULTIVITAL) TABS Take 1 Tab by mouth every day.      • Cholecalciferol (VITAMIN D) 1000 UNIT CAPS Take 2 Caps by mouth every day.       No current facility-administered medications for this visit.       Allergies   Allergen Reactions   • Amoxicillin Itching and Unspecified     Tight bumpy lips   • Milk [Dairy Food Allergy]        ROS  Review of Systems   Constitutional: Negative.  Negative for fever, chills, weight loss, malaise/fatigue and diaphoresis.   HENT: Negative.  Negative for hearing loss, ear pain, nosebleeds, congestion, sore throat, neck pain, tinnitus and ear discharge.    Eyes: Negative.  Negative for blurred vision, double vision, photophobia, pain, discharge and redness.   Respiratory: Negative.  Negative for cough, hemoptysis, sputum production, shortness of breath, wheezing and stridor.    Cardiovascular: Negative.  Negative for chest pain, palpitations, orthopnea, claudication, leg swelling and PND.   Gastrointestinal: Negative.  Negative for heartburn, nausea, vomiting, abdominal pain, diarrhea, constipation, blood in stool and melena.   Genitourinary: Negative.  Negative for dysuria, urgency, frequency, incontinence, hematuria and flank pain.   Musculoskeletal: Negative.  Negative for myalgias, back pain, joint pain and falls.   Skin: Negative.  Negative for itching and rash.   Neurological: Negative.  Negative for dizziness, tingling, tremors, sensory change, speech change, focal weakness, seizures, loss of consciousness, weakness and headaches.   Endo/Heme/Allergies: Negative.  Negative for environmental allergies and polydipsia. Does not bruise/bleed easily.   Psychiatric/Behavioral: Negative.  Negative for depression, suicidal ideas, hallucinations, memory loss and substance abuse. The patient is not nervous/anxious and does not have insomnia.    All other systems reviewed and are negative.           Objective:     /70 (BP Location: Right arm, Patient Position: Sitting)   Pulse 72   Temp 36.5 °C (97.7 °F) (Temporal)   Ht 1.575 m (5'  "2\")   Wt 67.1 kg (148 lb)   SpO2 99%  Body mass index is 27.07 kg/m².    Physical Exam:  Physical Exam   Vitals reviewed.  Constitutional: oriented to person, place, and time. appears well-developed and well-nourished. No distress.   HENT: Head: Normocephalic and atraumatic. Bilateral tympanic membranes wnl w/o bulging.  Right Ear: External ear normal. Left Ear: External ear normal. Nose: Nose normal.  Mouth/Throat: Oropharynx is clear and moist. No oropharyngeal exudate. emelina tm wnl. Eyes: Conjunctivae and EOM are normal. Pupils are equal, round, and reactive to light. Right eye exhibits no discharge. Left eye exhibits no discharge. No scleral icterus.    Neck: Normal range of motion. Neck supple. No JVD present.   Cardiovascular: Normal rate, regular rhythm, normal heart sounds and intact distal pulses.  Exam reveals no gallop and no friction rub.  No murmur heard.  No carotid bruits   Pulmonary/Chest: Effort normal and breath sounds normal. No stridor. No respiratory distress. no wheezes or rales. exhibits no tenderness.   Abdominal: Soft. Bowel sounds are normal. exhibits no distension and no mass. No tenderness. no rebound and no guarding.   Musculoskeletal: Normal range of motion. exhibits no edema or tenderness.  emelina pedal pulses 2+.  Lymphadenopathy:  no cervical or supraclavicular adenopathy.   Neurological: alert and oriented to person, place, and time. has normal reflexes. displays normal reflexes. No cranial nerve deficit. exhibits normal muscle tone. Coordination normal.   Skin: Skin is warm and dry. No rash noted. no diaphoresis. No erythema. No pallor.   Psychiatric: normal mood and affect. behavior is normal.       Assessment and Plan:     The following treatment plan was discussed:    1. Annual physical exam     2. Premenopausal patient  FSH    ESTRADIOL    2 week menstural cycle, FSH/LH, Estraidal ordered. f/u 1 months with results    3. Hypothyroidism, unspecified type      Stable on medications, " labs ordered f/u 1 month w/ results    4. Gastroesophageal reflux disease without esophagitis      Stable, no current episodes    5. Trigeminal neuralgia of left side of face      Stable, no recent episode. To return to office if she does have an episode          Followup: Return in about 1 month (around 10/14/2021).for lab review

## 2021-09-15 NOTE — NON-PROVIDER
MARIKA Telles is 45 years old and is here for annual physical exam. She reports having a longer period this last month, it was heavy and light off and on. She is usually very regular and tracks each cycle on an josh. The cycle was  8/26-9/9/21. She did have pubic pain towards the end of cycle for 3 days. Describes the pain as a sore, burning and achy. She also had increase breast tenderness throughout this cycle. She had intercourse with her  prior to this pain. The pain has now resolved. Denied dizziness.   Hypothyroid, stable on medications, taking appropriately, no issues  GERD, no recent episodes   Trigeminal neuralgia, no recent episodes, will return when it returns     Due for labs    Patient Problem List\  Patient Active Problem List   Diagnosis   • Family history of breast cancer in mother   • Scoliosis   • Hypothyroidism   • Proteinuria   • Tinnitus   • Allergic rhinitis   • GERD (gastroesophageal reflux disease)   • TMJ pain dysfunction syndrome   • Trigeminal neuralgia of left side of face         Current Medication    Current Outpatient Medications:   •  diclofenac DR, 75 mg, Oral, BID  •  tizanidine, 4 mg, Oral, QHS  •  levothyroxine, Take 100 mcg once a week and take 50 mcg on other days.  •  cyanocobalamin, 400 mcg, Oral, DAILY  •  Multivital, 1 Tablet, Oral, DAILY  •  Vitamin D, 2 Capsule, Oral, DAILY      Allergies  Amoxicillin and Milk [dairy food allergy]    Review of Systems   Constitutional: Negative.  Negative for fever, chills, weight loss, malaise/fatigue and diaphoresis.   HENT: Negative.  Negative for hearing loss, ear pain, nosebleeds, congestion, sore throat, neck pain, tinnitus and ear discharge.    Eyes: Negative.  Negative for blurred vision, double vision, photophobia, pain, discharge and redness.   Respiratory: Negative.  Negative for cough, hemoptysis, sputum production, shortness of breath, wheezing and stridor.    Cardiovascular: Negative.  Negative for chest pain,  "palpitations, orthopnea, claudication, leg swelling and PND.   Gastrointestinal: Negative.  Negative for heartburn, nausea, vomiting, abdominal pain, diarrhea, constipation, blood in stool and melena.   Genitourinary: Negative.  Negative for dysuria, urgency, frequency, incontinence, hematuria and flank pain.   Musculoskeletal: Negative.  Negative for myalgias, back pain, joint pain and falls.   Skin: Negative.  Negative for itching and rash.   Neurological: Negative.  Negative for dizziness, tingling, tremors, sensory change, speech change, focal weakness, seizures, loss of consciousness, weakness and headaches.   Endo/Heme/Allergies: Negative.  Negative for environmental allergies and polydipsia. Does not bruise/bleed easily.   Psychiatric/Behavioral: Negative.  Negative for depression, suicidal ideas, hallucinations, memory loss and substance abuse. The patient is not nervous/anxious and does not have insomnia.    All other systems reviewed and are negative.     Vital Signs    /70 (BP Location: Right arm, Patient Position: Sitting)   Pulse 72   Temp 36.5 °C (97.7 °F) (Temporal)   Ht 1.575 m (5' 2\")   Wt 67.1 kg (148 lb)   SpO2 99%     Physical Exam   Vitals reviewed.  Constitutional: oriented to person, place, and time. appears well-developed and well-nourished. No distress.   HENT: Head: Normocephalic and atraumatic. Bilateral tympanic membranes wnl w/o bulging.  Right Ear: External ear normal. Left Ear: External ear normal. Nose: Nose normal.  Mouth/Throat: Oropharynx is clear and moist. No oropharyngeal exudate. emelina tm wnl. Eyes: Conjunctivae and EOM are normal. Pupils are equal, round, and reactive to light. Right eye exhibits no discharge. Left eye exhibits no discharge. No scleral icterus.    Neck: Normal range of motion. Neck supple. No JVD present.   Cardiovascular: Normal rate, regular rhythm, normal heart sounds and intact distal pulses.  Exam reveals no gallop and no friction rub.  No murmur " heard.  No carotid bruits   Pulmonary/Chest: Effort normal and breath sounds normal. No stridor. No respiratory distress. no wheezes or rales. exhibits no tenderness.   Abdominal: Soft. Bowel sounds are normal. exhibits no distension and no mass. No tenderness. no rebound and no guarding.   Musculoskeletal: Normal range of motion. exhibits no edema or tenderness.  emelina pedal pulses 2+.  Lymphadenopathy:  no cervical or supraclavicular adenopathy.   Neurological: alert and oriented to person, place, and time. has normal reflexes. displays normal reflexes. No cranial nerve deficit. exhibits normal muscle tone. Coordination normal.   Skin: Skin is warm and dry. No rash. No diaphoresis. No erythema. No pallor.   Psychiatric: normal mood and affect. behavior is normal.       Assessment and Plan:   1. Annual physical exam     2. Premenopausal patient  FSH    ESTRADIOL    2 week menstural cycle, FSH/LH, Estraidal ordered. f/u 1 months with results    3. Hypothyroidism, unspecified type      Stable on medications, labs ordered f/u 1 month w/ results    4. Gastroesophageal reflux disease without esophagitis      Stable, no current episodes    5. Trigeminal neuralgia of left side of face      Stable, no recent episode. To return to office if she does have an episode          Follow up: Return in about 1 month (around 10/14/2021).

## 2021-10-02 ENCOUNTER — HOSPITAL ENCOUNTER (OUTPATIENT)
Dept: LAB | Facility: MEDICAL CENTER | Age: 46
End: 2021-10-02
Attending: NURSE PRACTITIONER
Payer: COMMERCIAL

## 2021-10-02 DIAGNOSIS — Z13.89 SCREENING FOR MULTIPLE CONDITIONS: ICD-10-CM

## 2021-10-02 DIAGNOSIS — N95.9 PREMENOPAUSAL PATIENT: ICD-10-CM

## 2021-10-02 DIAGNOSIS — E55.9 VITAMIN D DEFICIENCY: ICD-10-CM

## 2021-10-02 DIAGNOSIS — Z13.220 SCREENING FOR HYPERLIPIDEMIA: ICD-10-CM

## 2021-10-02 DIAGNOSIS — E03.9 ACQUIRED HYPOTHYROIDISM: ICD-10-CM

## 2021-10-02 DIAGNOSIS — R80.8 OTHER PROTEINURIA: ICD-10-CM

## 2021-10-02 LAB
25(OH)D3 SERPL-MCNC: 51 NG/ML (ref 30–100)
ALBUMIN SERPL BCP-MCNC: 4.3 G/DL (ref 3.2–4.9)
ALBUMIN/GLOB SERPL: 1.4 G/DL
ALP SERPL-CCNC: 68 U/L (ref 30–99)
ALT SERPL-CCNC: 14 U/L (ref 2–50)
ANION GAP SERPL CALC-SCNC: 10 MMOL/L (ref 7–16)
AST SERPL-CCNC: 18 U/L (ref 12–45)
BILIRUB SERPL-MCNC: 0.4 MG/DL (ref 0.1–1.5)
BUN SERPL-MCNC: 13 MG/DL (ref 8–22)
CALCIUM SERPL-MCNC: 9.3 MG/DL (ref 8.5–10.5)
CHLORIDE SERPL-SCNC: 105 MMOL/L (ref 96–112)
CHOLEST SERPL-MCNC: 192 MG/DL (ref 100–199)
CO2 SERPL-SCNC: 23 MMOL/L (ref 20–33)
CREAT SERPL-MCNC: 0.91 MG/DL (ref 0.5–1.4)
CREAT UR-MCNC: 80.76 MG/DL
ESTRADIOL SERPL-MCNC: 139 PG/ML
FASTING STATUS PATIENT QL REPORTED: NORMAL
FSH SERPL-ACNC: 5.7 MIU/ML
GLOBULIN SER CALC-MCNC: 3 G/DL (ref 1.9–3.5)
GLUCOSE SERPL-MCNC: 91 MG/DL (ref 65–99)
HDLC SERPL-MCNC: 86 MG/DL
LDLC SERPL CALC-MCNC: 87 MG/DL
MICROALBUMIN UR-MCNC: <1.2 MG/DL
MICROALBUMIN/CREAT UR: NORMAL MG/G (ref 0–30)
POTASSIUM SERPL-SCNC: 4.3 MMOL/L (ref 3.6–5.5)
PROT SERPL-MCNC: 7.3 G/DL (ref 6–8.2)
SODIUM SERPL-SCNC: 138 MMOL/L (ref 135–145)
T4 FREE SERPL-MCNC: 1.08 NG/DL (ref 0.93–1.7)
TRIGL SERPL-MCNC: 97 MG/DL (ref 0–149)
TSH SERPL DL<=0.005 MIU/L-ACNC: 4.02 UIU/ML (ref 0.38–5.33)

## 2021-10-02 PROCEDURE — 80053 COMPREHEN METABOLIC PANEL: CPT

## 2021-10-02 PROCEDURE — 84443 ASSAY THYROID STIM HORMONE: CPT

## 2021-10-02 PROCEDURE — 82306 VITAMIN D 25 HYDROXY: CPT

## 2021-10-02 PROCEDURE — 82570 ASSAY OF URINE CREATININE: CPT

## 2021-10-02 PROCEDURE — 83001 ASSAY OF GONADOTROPIN (FSH): CPT

## 2021-10-02 PROCEDURE — 80061 LIPID PANEL: CPT

## 2021-10-02 PROCEDURE — 82670 ASSAY OF TOTAL ESTRADIOL: CPT

## 2021-10-02 PROCEDURE — 84439 ASSAY OF FREE THYROXINE: CPT

## 2021-10-02 PROCEDURE — 82043 UR ALBUMIN QUANTITATIVE: CPT

## 2021-10-02 PROCEDURE — 36415 COLL VENOUS BLD VENIPUNCTURE: CPT

## 2021-10-06 ENCOUNTER — OFFICE VISIT (OUTPATIENT)
Dept: MEDICAL GROUP | Facility: MEDICAL CENTER | Age: 46
End: 2021-10-06
Payer: COMMERCIAL

## 2021-10-06 VITALS
OXYGEN SATURATION: 98 % | SYSTOLIC BLOOD PRESSURE: 118 MMHG | WEIGHT: 148 LBS | BODY MASS INDEX: 27.23 KG/M2 | TEMPERATURE: 97.5 F | DIASTOLIC BLOOD PRESSURE: 66 MMHG | HEIGHT: 62 IN | HEART RATE: 72 BPM

## 2021-10-06 DIAGNOSIS — N93.8 DUB (DYSFUNCTIONAL UTERINE BLEEDING): ICD-10-CM

## 2021-10-06 DIAGNOSIS — E03.9 ACQUIRED HYPOTHYROIDISM: ICD-10-CM

## 2021-10-06 DIAGNOSIS — H04.123 DRY EYES, BILATERAL: ICD-10-CM

## 2021-10-06 DIAGNOSIS — Z23 NEED FOR INFLUENZA VACCINATION: ICD-10-CM

## 2021-10-06 PROCEDURE — 90686 IIV4 VACC NO PRSV 0.5 ML IM: CPT | Performed by: NURSE PRACTITIONER

## 2021-10-06 PROCEDURE — 90471 IMMUNIZATION ADMIN: CPT | Performed by: NURSE PRACTITIONER

## 2021-10-06 PROCEDURE — 99214 OFFICE O/P EST MOD 30 MIN: CPT | Mod: 25 | Performed by: NURSE PRACTITIONER

## 2021-10-06 NOTE — PROGRESS NOTES
Subjective:     Koki Arguello is a 45 y.o. female who presents with hypothyroidism.    HPI:   Seen in fu for hypothyroidism.  She is having longer periods then normal periods.  Getting very irregular.  This has occurred over the last month.  Did have painful menses with breast tenderness and cramping.  This doesn't usually occur.  O/w before that all menses normal and stable.  Last pap smear 8/13/2020 was wnl.    Reviewed lab with pt.  GFR, CMP, LP, TSH, T4, D, alb/cr ratio is wnl  FSH, estradiol is wnl for premenopause.  No lab abn that would be assoc with menopause.   She is stable and well controlled on synthroid for her thyroid.  Taking med approp  She is using dry eyes.  Still having dry eyes and wakes her up at nite despite using artificial tears.  She heard on tv of product that will help.  She will email with name so i can order.  She is due flu shot    Patient Active Problem List    Diagnosis Date Noted   • Trigeminal neuralgia of left side of face 10/13/2017   • TMJ pain dysfunction syndrome 06/26/2017   • GERD (gastroesophageal reflux disease) 01/13/2016   • Allergic rhinitis 02/06/2013   • Tinnitus 11/08/2010   • Proteinuria 03/24/2010   • Hypothyroidism 03/19/2010   • Scoliosis    • Family history of breast cancer in mother 01/06/2010       Current medicines (including changes today)  Current Outpatient Medications   Medication Sig Dispense Refill   • levothyroxine (SYNTHROID) 50 MCG Tab Take 100 mcg once a week and take 50 mcg on other days. 102 Tab 3   • Multiple Vitamins-Minerals (MULTIVITAL) TABS Take 1 Tab by mouth every day.     • Cholecalciferol (VITAMIN D) 1000 UNIT CAPS Take 2 Caps by mouth every day.       No current facility-administered medications for this visit.       Allergies   Allergen Reactions   • Amoxicillin Itching and Unspecified     Tight bumpy lips   • Milk [Dairy Food Allergy]        ROS  Constitutional: Negative. Negative for fever, chills, weight loss,  "malaise/fatigue and diaphoresis.   HENT: Negative. Negative for hearing loss, ear pain, nosebleeds, congestion, sore throat, neck pain, tinnitus and ear discharge.   Respiratory: Negative. Negative for cough, hemoptysis, sputum production, shortness of breath, wheezing and stridor.   Cardiovascular: Negative. Negative for chest pain, palpitations, orthopnea, claudication, leg swelling and PND.   Gastrointestinal: Denies nausea, vomiting, diarrhea, constipation, heartburn, melena or hematochezia.  Genitourinary: Denies dysuria, hematuria, urinary incontinence, frequency or urgency.        Objective:     /66 (BP Location: Right arm, Patient Position: Sitting)   Pulse 72   Temp 36.4 °C (97.5 °F) (Temporal)   Ht 1.575 m (5' 2\")   Wt 67.1 kg (148 lb)   SpO2 98%  Body mass index is 27.07 kg/m².    Physical Exam:  Vitals reviewed.  Constitutional: Oriented to person, place, and time. appears well-developed and well-nourished. No distress.   Cardiovascular: Normal rate, regular rhythm, normal heart sounds and intact distal pulses. Exam reveals no gallop and no friction rub. No murmur heard. No carotid bruits.   Pulmonary/Chest: Effort normal and breath sounds normal. No stridor. No respiratory distress. no wheezes or rales. exhibits no tenderness.   Musculoskeletal: Normal range of motion. exhibits no edema. emelina pedal pulses 2+.  Lymphadenopathy: No cervical or supraclavicular adenopathy.   Neurological: Alert and oriented to person, place, and time. exhibits normal muscle tone.  Skin: Skin is warm and dry. No diaphoresis.   Psychiatric: Normal mood and affect. Behavior is normal.      Assessment and Plan:     The following treatment plan was discussed:    1. Acquired hypothyroidism      stable and well controlled on meds.  f/u yearly.  call for lab slip   2. DUB (dysfunctional uterine bleeding)      lab not showing menopause.  if sx continue thru next month she will email for vag us and gyn referral. f/u yearly " with me.  call for lab slip   3. Dry eyes, bilateral      pt will email with name of product to order for her dry eyes.   4. Need for influenza vaccination  INFLUENZA VACCINE QUAD INJ (PF)         Followup: Return in about 1 year (around 10/6/2022).

## 2022-11-28 DIAGNOSIS — E55.9 VITAMIN D DEFICIENCY: ICD-10-CM

## 2022-11-28 DIAGNOSIS — E03.9 ACQUIRED HYPOTHYROIDISM: ICD-10-CM

## 2022-11-28 DIAGNOSIS — Z13.220 SCREENING FOR HYPERLIPIDEMIA: ICD-10-CM

## 2022-11-28 DIAGNOSIS — R94.6 ABNORMAL THYROID FUNCTION TEST: ICD-10-CM

## 2022-11-28 DIAGNOSIS — R80.8 OTHER PROTEINURIA: ICD-10-CM

## 2022-11-28 DIAGNOSIS — Z13.89 SCREENING FOR MULTIPLE CONDITIONS: ICD-10-CM

## 2022-11-28 NOTE — LETTER
Koki Arguello  [address]  [place] [address]            12/9/2022        Dear Jamaica Telles, RONALD's office has been unable to reach you by phone regarding appointment needs to be scheduled and labs done prior to appointment I have sent you labs with this letter.    We ask that you contact us at [phone] at your earliest convenience. Our office hours are from 8:00a - 5:00p Monday thru Friday.    Kind regards,   Matilde Montesinos, Med Ass't

## 2022-11-30 NOTE — TELEPHONE ENCOUNTER
Received request via: Pharmacy    Was the patient seen in the last year in this department? No, LOV: 10/06/2022. [employer] message sent about overdue appt.    Does the patient have an active prescription (recently filled or refills available) for medication(s) requested? No    Does the patient have care home Plus and need 100 day supply (blood pressure, diabetes and cholesterol meds only)? Patient does not have SCP

## 2022-12-05 RX ORDER — LEVOTHYROXINE SODIUM 0.05 MG/1
TABLET ORAL
Qty: 15 TABLET | Refills: 0 | Status: SHIPPED | OUTPATIENT
Start: 2022-12-05 | End: 2023-01-17 | Stop reason: SDUPTHER

## 2022-12-09 NOTE — TELEPHONE ENCOUNTER
Patient was sent a letter along with labs so she can get them done prior to her next appointment with Ms Gonzalez

## 2023-01-11 ENCOUNTER — HOSPITAL ENCOUNTER (OUTPATIENT)
Dept: LAB | Facility: MEDICAL CENTER | Age: 48
End: 2023-01-11
Attending: NURSE PRACTITIONER
Payer: COMMERCIAL

## 2023-01-11 DIAGNOSIS — Z13.220 SCREENING FOR HYPERLIPIDEMIA: ICD-10-CM

## 2023-01-11 DIAGNOSIS — E55.9 VITAMIN D DEFICIENCY: ICD-10-CM

## 2023-01-11 DIAGNOSIS — Z13.89 SCREENING FOR MULTIPLE CONDITIONS: ICD-10-CM

## 2023-01-11 DIAGNOSIS — R80.8 OTHER PROTEINURIA: ICD-10-CM

## 2023-01-11 LAB
25(OH)D3 SERPL-MCNC: 74 NG/ML (ref 30–100)
ALBUMIN SERPL BCP-MCNC: 4.5 G/DL (ref 3.2–4.9)
ALBUMIN/GLOB SERPL: 1.6 G/DL
ALP SERPL-CCNC: 71 U/L (ref 30–99)
ALT SERPL-CCNC: 10 U/L (ref 2–50)
ANION GAP SERPL CALC-SCNC: 10 MMOL/L (ref 7–16)
AST SERPL-CCNC: 18 U/L (ref 12–45)
BILIRUB SERPL-MCNC: 0.4 MG/DL (ref 0.1–1.5)
BUN SERPL-MCNC: 14 MG/DL (ref 8–22)
CALCIUM ALBUM COR SERPL-MCNC: 8.8 MG/DL (ref 8.5–10.5)
CALCIUM SERPL-MCNC: 9.2 MG/DL (ref 8.5–10.5)
CHLORIDE SERPL-SCNC: 106 MMOL/L (ref 96–112)
CHOLEST SERPL-MCNC: 177 MG/DL (ref 100–199)
CO2 SERPL-SCNC: 22 MMOL/L (ref 20–33)
CREAT SERPL-MCNC: 0.78 MG/DL (ref 0.5–1.4)
CREAT UR-MCNC: 82.33 MG/DL
EST. AVERAGE GLUCOSE BLD GHB EST-MCNC: 108 MG/DL
FASTING STATUS PATIENT QL REPORTED: NORMAL
GFR SERPLBLD CREATININE-BSD FMLA CKD-EPI: 94 ML/MIN/1.73 M 2
GLOBULIN SER CALC-MCNC: 2.9 G/DL (ref 1.9–3.5)
GLUCOSE SERPL-MCNC: 88 MG/DL (ref 65–99)
HBA1C MFR BLD: 5.4 % (ref 4–5.6)
HDLC SERPL-MCNC: 80 MG/DL
LDLC SERPL CALC-MCNC: 82 MG/DL
MICROALBUMIN UR-MCNC: <1.2 MG/DL
MICROALBUMIN/CREAT UR: NORMAL MG/G (ref 0–30)
POTASSIUM SERPL-SCNC: 4.1 MMOL/L (ref 3.6–5.5)
PROT SERPL-MCNC: 7.4 G/DL (ref 6–8.2)
SODIUM SERPL-SCNC: 138 MMOL/L (ref 135–145)
TRIGL SERPL-MCNC: 77 MG/DL (ref 0–149)

## 2023-01-11 PROCEDURE — 82306 VITAMIN D 25 HYDROXY: CPT

## 2023-01-11 PROCEDURE — 82570 ASSAY OF URINE CREATININE: CPT

## 2023-01-11 PROCEDURE — 36415 COLL VENOUS BLD VENIPUNCTURE: CPT

## 2023-01-11 PROCEDURE — 83036 HEMOGLOBIN GLYCOSYLATED A1C: CPT

## 2023-01-11 PROCEDURE — 82043 UR ALBUMIN QUANTITATIVE: CPT

## 2023-01-11 PROCEDURE — 80061 LIPID PANEL: CPT

## 2023-01-11 PROCEDURE — 80053 COMPREHEN METABOLIC PANEL: CPT

## 2023-01-11 NOTE — TELEPHONE ENCOUNTER
Initial Anesthesia Post-op Note    Patient: Inocencia Pickens  Procedure(s) Performed: RIGHT KNEE MANIPULATION - RIGHT  Anesthesia type: MAC    Vitals Value Taken Time   Temp 36.0 01/11/23 0931   Pulse 62 01/11/23 0900   Resp 15 01/11/23 0900   SpO2 100 % 01/11/23 0900   /52 01/11/23 0900         Patient Location: PACU Phase 1  Post-op Vital Signs:stable  Level of Consciousness: awake and alert  Respiratory Status: spontaneous ventilation  Cardiovascular stable  Hydration: euvolemic  Pain Management: adequately controlled  Handoff: Handoff to receiving clinician was performed and questions were answered  Vomiting: none  Nausea: None  Airway Patency:patent  Post-op Assessment: no complications and patient tolerated procedure well with no complications      No notable events documented.   Letter sent

## 2023-01-17 ENCOUNTER — OFFICE VISIT (OUTPATIENT)
Dept: MEDICAL GROUP | Facility: MEDICAL CENTER | Age: 48
End: 2023-01-17
Payer: COMMERCIAL

## 2023-01-17 VITALS
HEIGHT: 63 IN | TEMPERATURE: 98.2 F | DIASTOLIC BLOOD PRESSURE: 68 MMHG | OXYGEN SATURATION: 98 % | SYSTOLIC BLOOD PRESSURE: 110 MMHG | HEART RATE: 84 BPM | RESPIRATION RATE: 16 BRPM | WEIGHT: 149 LBS | BODY MASS INDEX: 26.4 KG/M2

## 2023-01-17 DIAGNOSIS — D17.21 LIPOMA OF RIGHT SHOULDER: ICD-10-CM

## 2023-01-17 DIAGNOSIS — M25.551 ACUTE RIGHT HIP PAIN: ICD-10-CM

## 2023-01-17 DIAGNOSIS — E03.9 ACQUIRED HYPOTHYROIDISM: ICD-10-CM

## 2023-01-17 DIAGNOSIS — Z12.31 ENCOUNTER FOR SCREENING MAMMOGRAM FOR MALIGNANT NEOPLASM OF BREAST: ICD-10-CM

## 2023-01-17 PROCEDURE — 99214 OFFICE O/P EST MOD 30 MIN: CPT | Performed by: NURSE PRACTITIONER

## 2023-01-17 PROCEDURE — RXMED WILLOW AMBULATORY MEDICATION CHARGE: Performed by: NURSE PRACTITIONER

## 2023-01-17 RX ORDER — LEVOTHYROXINE SODIUM 0.05 MG/1
TABLET ORAL
Qty: 102 TABLET | Refills: 0 | Status: SHIPPED | OUTPATIENT
Start: 2023-01-17 | End: 2023-06-12

## 2023-01-17 NOTE — PROGRESS NOTES
Subjective:     Koki Arguello is a 47 y.o. female who presents with hypothyroidism.    HPI:   Seen in f/u for hypothyroidism.  She is feeling well.  She recently hurt her rt hip.  She was lifting boxes up at [place] and felt a pull on rt hip. She is able to stretch.  Not doing any running on it yet.  She initially has rt leg numbness.  Has a hx of LBP. She was not sleeping intially with the pain.  Pain is now improving slowly.  The pain started late november.    She has a large lipoma on rt shoulder.  There is occas rt hand numbness.  It is getting larger.  Would like removed  Reviewed lab with pt. CMP, LP, A1C, vitamin d, alb/cr ratio is wnl  I forgot to order thyroid lab.  She will get it done now.  Stable on levothyroxine.  Needs refill.  She is due mammo.  She is on healthy diet.  Exercising regularly about 3x/wk    Patient Active Problem List    Diagnosis Date Noted    Trigeminal neuralgia of left side of face 10/13/2017    TMJ pain dysfunction syndrome 06/26/2017    GERD (gastroesophageal reflux disease) 01/13/2016    Allergic rhinitis 02/06/2013    Tinnitus 11/08/2010    Proteinuria 03/24/2010    Hypothyroidism 03/19/2010    Scoliosis     Family history of breast cancer in mother 01/06/2010       Current medicines (including changes today)  Current Outpatient Medications   Medication Sig Dispense Refill    levothyroxine (SYNTHROID) 50 MCG Tab TAKE TWO TABLETS BY MOUTH ONCE A WEEK AND TAKE ONE TABLET BY MOUTH DAILY ALL OTHER DAYS 102 Tablet 0    Multiple Vitamins-Minerals (MULTIVITAL) TABS Take 1 Tab by mouth every day.      Cholecalciferol (VITAMIN D) 1000 UNIT CAPS Take 2 Caps by mouth every day.       No current facility-administered medications for this visit.       Allergies   Allergen Reactions    Amoxicillin Itching and Unspecified     Tight bumpy lips    Milk [Dairy Food Allergy]        ROS  Constitutional: Negative. Negative for fever, chills, weight loss, malaise/fatigue and  "diaphoresis.   HENT: Negative. Negative for hearing loss, ear pain, nosebleeds, congestion, sore throat, neck pain, tinnitus and ear discharge.   Respiratory: Negative. Negative for cough, hemoptysis, sputum production, shortness of breath, wheezing and stridor.   Cardiovascular: Negative. Negative for chest pain, palpitations, orthopnea, claudication, leg swelling and PND.   Gastrointestinal: Denies nausea, vomiting, diarrhea, constipation, heartburn, melena or hematochezia.  Genitourinary: Denies dysuria, hematuria, urinary incontinence, frequency or urgency.        Objective:     /68   Pulse 84   Temp 36.8 °C (98.2 °F) (Temporal)   Resp 16   Ht 1.6 m (5' 3\")   Wt 67.6 kg (149 lb)   SpO2 98%  Body mass index is 26.39 kg/m².    Physical Exam:  Vitals reviewed.  Constitutional: Oriented to person, place, and time. appears well-developed and well-nourished. No distress.   Cardiovascular: Normal rate, regular rhythm, normal heart sounds and intact distal pulses. Exam reveals no gallop and no friction rub. No murmur heard. No carotid bruits.   Pulmonary/Chest: Effort normal and breath sounds normal. No stridor. No respiratory distress. no wheezes or rales. exhibits no tenderness.   Musculoskeletal: Normal range of motion. exhibits no edema. emelina pedal pulses 2+.  Lymphadenopathy: No cervical or supraclavicular adenopathy.   Neurological: Alert and oriented to person, place, and time. exhibits normal muscle tone.  Skin: Skin is warm and dry. No diaphoresis.   Psychiatric: Normal mood and affect. Behavior is normal.      Assessment and Plan:     The following treatment plan was discussed:    1. Acquired hypothyroidism  levothyroxine (SYNTHROID) 50 MCG Tab    TSH    FREE THYROXINE    refill synthroid.  chk TSH, T4.  f/u w/pt w/results.  then f/u 8/23 for PE/pap smear.       2. Acute right hip pain      improving but if doesn't resolve will email for lumbar and hip rt xray.   continue stretching exercises.     "   3. Lipoma of right shoulder  Referral to Dermatology    lipoma getting bigger and causing pain.  refer derm for removal      4. Encounter for screening mammogram for malignant neoplasm of breast  [facility]-SCREENING MAMMO BILAT W/CAD            Followup: Return in about 7 months (around 8/17/2023), or for pap smear.

## 2023-01-24 ENCOUNTER — PHARMACY VISIT (OUTPATIENT)
Dept: PHARMACY | Facility: MEDICAL CENTER | Age: 48
End: 2023-01-24
Payer: COMMERCIAL

## 2023-01-24 PROCEDURE — RXOTC WILLOW AMBULATORY OTC CHARGE

## 2023-03-08 ENCOUNTER — HOSPITAL ENCOUNTER (OUTPATIENT)
Dept: RADIOLOGY | Facility: MEDICAL CENTER | Age: 48
End: 2023-03-08
Attending: NURSE PRACTITIONER
Payer: COMMERCIAL

## 2023-03-08 DIAGNOSIS — Z12.31 ENCOUNTER FOR SCREENING MAMMOGRAM FOR MALIGNANT NEOPLASM OF BREAST: ICD-10-CM

## 2023-03-08 PROCEDURE — 77063 BREAST TOMOSYNTHESIS BI: CPT

## 2023-03-14 ENCOUNTER — HOSPITAL ENCOUNTER (OUTPATIENT)
Dept: RADIOLOGY | Facility: MEDICAL CENTER | Age: 48
End: 2023-03-14
Attending: NURSE PRACTITIONER
Payer: COMMERCIAL

## 2023-03-14 DIAGNOSIS — R92.8 ABNORMAL MAMMOGRAM: ICD-10-CM

## 2023-03-14 PROCEDURE — 77065 DX MAMMO INCL CAD UNI: CPT | Mod: RT

## 2023-04-11 ENCOUNTER — HOSPITAL ENCOUNTER (OUTPATIENT)
Dept: LAB | Facility: MEDICAL CENTER | Age: 48
End: 2023-04-11
Attending: NURSE PRACTITIONER
Payer: COMMERCIAL

## 2023-04-11 DIAGNOSIS — E03.9 ACQUIRED HYPOTHYROIDISM: ICD-10-CM

## 2023-04-11 LAB
T4 FREE SERPL-MCNC: 1.24 NG/DL (ref 0.93–1.7)
TSH SERPL DL<=0.005 MIU/L-ACNC: 3.47 UIU/ML (ref 0.38–5.33)

## 2023-04-11 PROCEDURE — 84439 ASSAY OF FREE THYROXINE: CPT

## 2023-04-11 PROCEDURE — 84443 ASSAY THYROID STIM HORMONE: CPT

## 2023-04-11 PROCEDURE — 36415 COLL VENOUS BLD VENIPUNCTURE: CPT

## 2023-08-03 ENCOUNTER — HOSPITAL ENCOUNTER (OUTPATIENT)
Facility: MEDICAL CENTER | Age: 48
End: 2023-08-03
Attending: NURSE PRACTITIONER
Payer: COMMERCIAL

## 2023-08-03 ENCOUNTER — OFFICE VISIT (OUTPATIENT)
Dept: MEDICAL GROUP | Facility: MEDICAL CENTER | Age: 48
End: 2023-08-03
Payer: COMMERCIAL

## 2023-08-03 VITALS
WEIGHT: 150 LBS | SYSTOLIC BLOOD PRESSURE: 108 MMHG | OXYGEN SATURATION: 97 % | BODY MASS INDEX: 26.58 KG/M2 | DIASTOLIC BLOOD PRESSURE: 60 MMHG | HEIGHT: 63 IN | TEMPERATURE: 97.7 F | HEART RATE: 83 BPM | RESPIRATION RATE: 17 BRPM

## 2023-08-03 DIAGNOSIS — Z12.4 ROUTINE CERVICAL SMEAR: ICD-10-CM

## 2023-08-03 DIAGNOSIS — Z12.72 SMEAR, VAGINAL, AS PART OF ROUTINE GYNECOLOGICAL EXAMINATION: ICD-10-CM

## 2023-08-03 DIAGNOSIS — Z01.419 SMEAR, VAGINAL, AS PART OF ROUTINE GYNECOLOGICAL EXAMINATION: ICD-10-CM

## 2023-08-03 DIAGNOSIS — M25.552 LEFT HIP PAIN: ICD-10-CM

## 2023-08-03 DIAGNOSIS — Z12.11 SCREENING FOR MALIGNANT NEOPLASM OF COLON: ICD-10-CM

## 2023-08-03 LAB — AMBIGUOUS DTTM AMBI4: NORMAL

## 2023-08-03 PROCEDURE — 88175 CYTOPATH C/V AUTO FLUID REDO: CPT

## 2023-08-03 PROCEDURE — 99396 PREV VISIT EST AGE 40-64: CPT | Performed by: NURSE PRACTITIONER

## 2023-08-03 PROCEDURE — 3074F SYST BP LT 130 MM HG: CPT | Performed by: NURSE PRACTITIONER

## 2023-08-03 PROCEDURE — 87624 HPV HI-RISK TYP POOLED RSLT: CPT

## 2023-08-03 PROCEDURE — 3078F DIAST BP <80 MM HG: CPT | Performed by: NURSE PRACTITIONER

## 2023-08-03 ASSESSMENT — PATIENT HEALTH QUESTIONNAIRE - PHQ9: CLINICAL INTERPRETATION OF PHQ2 SCORE: 0

## 2023-08-03 NOTE — PROGRESS NOTES
Subjective     Koki Arguello is a 47 y.o. female who presents with pap smear          HPI  Seen in f/u for pap smear.  She is feeling well  She recently has rt upper arm lipoma removed.  The scar is large with elevated skin. No sx of infection.  Derm told her to see how it was in 2 mo.  Then f/u for scar removal if still puckered. Not a keloid.  She is due updated colonoscopy and pap smear.  She is having lt hip pain.  Sometimes her left leg will fall asleep.  The pain and numbness will radiate all the way down the back of her leg to her toes.  Wakes her up at [place]ie.  No back pain.  She has hx of fall many years ago.  At that time she was told that she had a bulging disc.  She was treated with chiro and PT.  Now she is seeing chiro at her gym.     Patient Active Problem List    Diagnosis Date Noted    Trigeminal neuralgia of left side of face 10/13/2017    TMJ pain dysfunction syndrome 06/26/2017    GERD (gastroesophageal reflux disease) 01/13/2016    Allergic rhinitis 02/06/2013    Tinnitus 11/08/2010    Proteinuria 03/24/2010    Hypothyroidism 03/19/2010    Scoliosis     Family history of breast cancer in mother 01/06/2010     Current Outpatient Medications   Medication Sig Dispense Refill    levothyroxine (SYNTHROID) 50 MCG Tab TAKE TWO TABLETS BY MOUTH ONCE WEEKLY, AND TAKE ONE TABLET BY MOUTH DAILY ALL OTHER DAYS. 102 Tablet 0    Multiple Vitamins-Minerals (MULTIVITAL) TABS Take 1 Tab by mouth every day.      Cholecalciferol (VITAMIN D) 1000 UNIT CAPS Take 2 Caps by mouth every day.       No current facility-administered medications for this visit.     Amoxicillin and Milk [dairy food allergy]    ROS  Review of Systems   Constitutional: Negative.  Negative for fever, chills, weight loss, malaise/fatigue and diaphoresis.   HENT: Negative.  Negative for hearing loss, ear pain, nosebleeds, congestion, sore throat, neck pain, tinnitus and ear discharge.    Eyes: Negative.  Negative for blurred vision,  "double vision, photophobia, pain, discharge and redness.   Respiratory: Negative.  Negative for cough, hemoptysis, sputum production, shortness of breath, wheezing and stridor.    Cardiovascular: Negative.  Negative for chest pain, palpitations, orthopnea, claudication, leg swelling and PND.   Gastrointestinal: Negative.  Negative for heartburn, nausea, vomiting, abdominal pain, diarrhea, constipation, blood in stool and melena.   Genitourinary: Negative.  Negative for dysuria, urgency, frequency, incontinence, hematuria and flank pain.   Musculoskeletal: Negative.  Negative for myalgias, falls.   Skin: Negative.  Negative for itching and rash.   Neurological: Negative.  Negative for dizziness,  tremors, sensory change, speech change, focal weakness, seizures, loss of consciousness, weakness and headaches.   Endo/Heme/Allergies: Negative.  Negative for environmental allergies and polydipsia. Does not bruise/bleed easily.   Psychiatric/Behavioral: Negative.  Negative for depression, suicidal ideas, hallucinations, memory loss and substance abuse. The patient is not nervous/anxious and does not have insomnia.    All other systems reviewed and are negative.      Objective     /60 (BP Location: Right arm, Patient Position: Sitting, BP Cuff Size: Adult)   Pulse 83   Temp 36.5 °C (97.7 °F) (Temporal)   Resp 17   Ht 1.6 m (5' 3\")   Wt 68 kg (150 lb)   SpO2 97%   BMI 26.57 kg/m²      Physical Exam  Physical Exam   Vitals reviewed.  Constitutional: oriented to person, place, and time. appears well-developed and well-nourished. No distress.   HENT: Head: Normocephalic and atraumatic. Bilateral tympanic membranes wnl w/o bulging.  Right Ear: External ear normal. Left Ear: External ear normal. Nose: Nose normal.  Mouth/Throat: Oropharynx is clear and moist. No oropharyngeal exudate. emelina tm wnl. Eyes: Conjunctivae and EOM are normal. Pupils are equal, round, and reactive to light. Right eye exhibits no discharge. " Left eye exhibits no discharge. No scleral icterus.    Neck: Normal range of motion. Neck supple. No JVD present.   Cardiovascular: Normal rate, regular rhythm, normal heart sounds and intact distal pulses.  Exam reveals no gallop and no friction rub.  No murmur heard.  No carotid bruits   Pulmonary/Chest: Effort normal and breath sounds normal. No stridor. No respiratory distress. no wheezes or rales. exhibits no tenderness.   Abdominal: Soft. Bowel sounds are normal. exhibits no distension and no mass. No tenderness. no rebound and no guarding.   Musculoskeletal: Normal range of motion. exhibits no edema or tenderness.  emelina pedal pulses 2+.  Lymphadenopathy:  no cervical or supraclavicular adenopathy.   Neurological: alert and oriented to person, place, and time. has normal reflexes. displays normal reflexes. No cranial nerve deficit. exhibits normal muscle tone. Coordination normal.   Skin: Skin is warm and dry. No rash noted. no diaphoresis. No erythema. No pallor.   Psychiatric: normal mood and affect. behavior is normal.   SUBJECTIVE: 47 y.o. female for annual routine pap and checkup.  Gyn History:   Last Pap: 20  Contraception: vasectomy  H/O Abnormal Pap no  H/O STI none  Current partner: monogamous  Lmp: 23  ROS:  Menses every month with no excessive cramping or bleeding.  No analgesics required during menses.  No pelvic pain, vaginal discharge or dyspareunia.  No breast tenderness, mass, nipple discharge, changes in size or contour, or abnormal cyclic discomfort.   Breast Exam:Performed with instruction during examination. Breasts equal size and shape.  No axillary lymphadenopathy, no skin changes, no dominant masses. No nipple retraction  Pelvic Exam - Sure Path Pap obtained and specimen sent to lab. Normal external genitalia with no lesions. Normal vaginal mucosa with normal rugation. Cervix has no visible lesions. No cervical motion tenderness. Uterus is normal sized with no masses. No  adnexal tenderness or enlargement appreciated.      Assessment & Plan     1. Smear, vaginal, as part of routine gynecological examination  THINPREP PAP WITH HPV    f/u w/pt w/test results. f/u 1/24 call for lab slip.        2. Routine cervical smear  THINPREP PAP WITH HPV    all is stable. f/u w/pt with pap smear results. plan: f/u 1/24 call for lab slip      3. Left hip pain  DX-LUMBAR SPINE-2 OR 3 VIEWS    DX-HIP-UNILATERAL-WITH PELVIS-1 VIEW LEFT    xray left hip w/pelvis and lumbar spine.  f/u with pt w/results. continue chiro and PT      4. Screening for malignant neoplasm of colon  Referral to Gastroenterology    refer GI for colonoscopy

## 2023-08-06 LAB
CYTOLOGIST CVX/VAG CYTO: NORMAL
CYTOLOGY CVX/VAG DOC CYTO: NORMAL
CYTOLOGY CVX/VAG DOC THIN PREP: NORMAL
HPV I/H RISK 4 DNA CVX QL PROBE+SIG AMP: NEGATIVE
NOTE NL11727A: NORMAL
OTHER STN SPEC: NORMAL
STAT OF ADQ CVX/VAG CYTO-IMP: NORMAL

## 2023-09-27 ENCOUNTER — HOSPITAL ENCOUNTER (OUTPATIENT)
Dept: RADIOLOGY | Facility: MEDICAL CENTER | Age: 48
End: 2023-09-27
Attending: NURSE PRACTITIONER
Payer: COMMERCIAL

## 2023-09-27 DIAGNOSIS — M25.552 LEFT HIP PAIN: ICD-10-CM

## 2023-09-27 PROCEDURE — 72100 X-RAY EXAM L-S SPINE 2/3 VWS: CPT

## 2023-09-27 PROCEDURE — 73501 X-RAY EXAM HIP UNI 1 VIEW: CPT | Mod: LT

## 2024-01-11 ENCOUNTER — APPOINTMENT (OUTPATIENT)
Dept: MEDICAL GROUP | Facility: MEDICAL CENTER | Age: 49
End: 2024-01-11
Payer: COMMERCIAL

## 2024-02-14 ENCOUNTER — APPOINTMENT (OUTPATIENT)
Dept: MEDICAL GROUP | Facility: MEDICAL CENTER | Age: 49
End: 2024-02-14
Payer: COMMERCIAL

## 2024-03-02 DIAGNOSIS — E03.9 ACQUIRED HYPOTHYROIDISM: ICD-10-CM

## 2024-03-02 RX ORDER — LEVOTHYROXINE SODIUM 0.05 MG/1
TABLET ORAL
Qty: 30 TABLET | Refills: 0 | Status: SHIPPED | OUTPATIENT
Start: 2024-03-02 | End: 2024-03-13 | Stop reason: SDUPTHER

## 2024-03-13 ENCOUNTER — OFFICE VISIT (OUTPATIENT)
Dept: MEDICAL GROUP | Facility: MEDICAL CENTER | Age: 49
End: 2024-03-13
Payer: COMMERCIAL

## 2024-03-13 VITALS
OXYGEN SATURATION: 95 % | DIASTOLIC BLOOD PRESSURE: 60 MMHG | HEART RATE: 71 BPM | HEIGHT: 61 IN | TEMPERATURE: 98.7 F | BODY MASS INDEX: 29.83 KG/M2 | SYSTOLIC BLOOD PRESSURE: 100 MMHG | WEIGHT: 158 LBS

## 2024-03-13 DIAGNOSIS — E55.9 VITAMIN D DEFICIENCY: ICD-10-CM

## 2024-03-13 DIAGNOSIS — Z13.0 SCREENING, ANEMIA, DEFICIENCY, IRON: ICD-10-CM

## 2024-03-13 DIAGNOSIS — Z13.1 SCREENING FOR DIABETES MELLITUS: ICD-10-CM

## 2024-03-13 DIAGNOSIS — N93.8 DUB (DYSFUNCTIONAL UTERINE BLEEDING): ICD-10-CM

## 2024-03-13 DIAGNOSIS — E03.9 ACQUIRED HYPOTHYROIDISM: ICD-10-CM

## 2024-03-13 DIAGNOSIS — Z12.11 SCREENING FOR MALIGNANT NEOPLASM OF COLON: ICD-10-CM

## 2024-03-13 DIAGNOSIS — Z13.89 SCREENING FOR MULTIPLE CONDITIONS: ICD-10-CM

## 2024-03-13 DIAGNOSIS — Z13.220 SCREENING FOR HYPERLIPIDEMIA: ICD-10-CM

## 2024-03-13 PROCEDURE — 3078F DIAST BP <80 MM HG: CPT | Performed by: NURSE PRACTITIONER

## 2024-03-13 PROCEDURE — 99214 OFFICE O/P EST MOD 30 MIN: CPT | Performed by: NURSE PRACTITIONER

## 2024-03-13 PROCEDURE — 3074F SYST BP LT 130 MM HG: CPT | Performed by: NURSE PRACTITIONER

## 2024-03-13 RX ORDER — LEVOTHYROXINE SODIUM 0.05 MG/1
TABLET ORAL
Qty: 100 TABLET | Refills: 0 | Status: SHIPPED | OUTPATIENT
Start: 2024-03-13

## 2024-03-13 NOTE — PROGRESS NOTES
Subjective:     Koki Arguello is a 48 y.o. female who presents with hypothyroidism.    HPI:   Seen in f/u for hypothyroidism.  She is feelng well.  She is due updated colonoscopy.   She is stable and well controlled on levothyroxine.  She is due updated lab for her preventative lab.   She is having heavy menses recently.  For 2 days they will be heaving bleeding      Patient Active Problem List    Diagnosis Date Noted    Trigeminal neuralgia of left side of face 10/13/2017    TMJ pain dysfunction syndrome 06/26/2017    GERD (gastroesophageal reflux disease) 01/13/2016    Allergic rhinitis 02/06/2013    Tinnitus 11/08/2010    Proteinuria 03/24/2010    Hypothyroidism 03/19/2010    Scoliosis     Family history of breast cancer in mother 01/06/2010       Current medicines (including changes today)  Current Outpatient Medications   Medication Sig Dispense Refill    levothyroxine (SYNTHROID) 50 MCG Tab TAKE 2 TABLETS BY MOUTH ONCE WEEKLY, AND TAKE 1 TABLET BY MOUTH DAILY ALL OTHER DAYS. 100 Tablet 0    Multiple Vitamins-Minerals (MULTIVITAL) TABS Take 1 Tab by mouth every day.      Cholecalciferol (VITAMIN D) 1000 UNIT CAPS Take 2 Caps by mouth every day.       No current facility-administered medications for this visit.       Allergies   Allergen Reactions    Amoxicillin Itching and Unspecified     Tight bumpy lips    Milk [Dairy Food Allergy]        ROS  Constitutional: Negative. Negative for fever, chills, weight loss, malaise/fatigue and diaphoresis.   HENT: Negative. Negative for hearing loss, ear pain, nosebleeds, congestion, sore throat, neck pain, tinnitus and ear discharge.   Respiratory: Negative. Negative for cough, hemoptysis, sputum production, shortness of breath, wheezing and stridor.   Cardiovascular: Negative. Negative for chest pain, palpitations, orthopnea, claudication, leg swelling and PND.   Gastrointestinal: Denies nausea, vomiting, diarrhea, constipation, heartburn, melena or  "hematochezia.  Genitourinary: Denies dysuria, hematuria, urinary incontinence, frequency or urgency.        Objective:     /60 (BP Location: Right arm, Patient Position: Sitting, BP Cuff Size: Adult)   Pulse 71   Temp 37.1 °C (98.7 °F) (Temporal)   Ht 1.54 m (5' 0.63\")   Wt 71.7 kg (158 lb)   SpO2 95%  Body mass index is 30.22 kg/m².    Physical Exam:  Vitals reviewed.  Constitutional: Oriented to person, place, and time. appears well-developed and well-nourished. No distress.   Cardiovascular: Normal rate, regular rhythm, normal heart sounds and intact distal pulses. Exam reveals no gallop and no friction rub. No murmur heard. No carotid bruits.   Pulmonary/Chest: Effort normal and breath sounds normal. No stridor. No respiratory distress. no wheezes or rales. exhibits no tenderness.   Musculoskeletal: Normal range of motion. exhibits no edema. emelina pedal pulses 2+.  Lymphadenopathy: No cervical or supraclavicular adenopathy.   Neurological: Alert and oriented to person, place, and time. exhibits normal muscle tone.  Skin: Skin is warm and dry. No diaphoresis.   Psychiatric: Normal mood and affect. Behavior is normal.      Assessment and Plan:     The following treatment plan was discussed:    1. Acquired hypothyroidism  levothyroxine (SYNTHROID) 50 MCG Tab    refill synthroid.  chk TSH, T4 with other lab.  f/u for lab review      2. DUB (dysfunctional uterine bleeding)  FSH/LH    ESTRADIOL    menses becoming heavier.  ck lab for menopause      3. Screening for malignant neoplasm of colon  Referral to Gastroenterology    refer GI for colonosocpy            Followup: Return in about 4 weeks (around 4/10/2024), or for lab review.  "

## 2024-04-17 ENCOUNTER — OFFICE VISIT (OUTPATIENT)
Dept: MEDICAL GROUP | Facility: MEDICAL CENTER | Age: 49
End: 2024-04-17
Payer: COMMERCIAL

## 2024-04-17 VITALS
OXYGEN SATURATION: 94 % | BODY MASS INDEX: 28.25 KG/M2 | SYSTOLIC BLOOD PRESSURE: 98 MMHG | HEIGHT: 61 IN | DIASTOLIC BLOOD PRESSURE: 58 MMHG | TEMPERATURE: 98.4 F | HEART RATE: 67 BPM | WEIGHT: 149.6 LBS

## 2024-04-17 DIAGNOSIS — R20.2 NUMBNESS AND TINGLING IN LEFT HAND: ICD-10-CM

## 2024-04-17 DIAGNOSIS — R51.9 LEFT FACIAL PRESSURE AND PAIN: ICD-10-CM

## 2024-04-17 DIAGNOSIS — M79.602 LEFT ARM PAIN: ICD-10-CM

## 2024-04-17 DIAGNOSIS — R20.0 NUMBNESS AND TINGLING IN LEFT HAND: ICD-10-CM

## 2024-04-17 PROCEDURE — 3078F DIAST BP <80 MM HG: CPT | Performed by: NURSE PRACTITIONER

## 2024-04-17 PROCEDURE — 3074F SYST BP LT 130 MM HG: CPT | Performed by: NURSE PRACTITIONER

## 2024-04-17 PROCEDURE — 99214 OFFICE O/P EST MOD 30 MIN: CPT | Performed by: NURSE PRACTITIONER

## 2024-04-17 RX ORDER — DICLOFENAC SODIUM 75 MG/1
75 TABLET, DELAYED RELEASE ORAL 2 TIMES DAILY PRN
Qty: 30 TABLET | Refills: 0 | Status: SHIPPED | OUTPATIENT
Start: 2024-04-17

## 2024-04-17 RX ORDER — GABAPENTIN 100 MG/1
100 CAPSULE ORAL NIGHTLY PRN
Qty: 30 CAPSULE | Refills: 0 | Status: SHIPPED | OUTPATIENT
Start: 2024-04-17

## 2024-04-17 ASSESSMENT — PATIENT HEALTH QUESTIONNAIRE - PHQ9: CLINICAL INTERPRETATION OF PHQ2 SCORE: 0

## 2024-04-18 NOTE — PROGRESS NOTES
Subjective:     Koki Arguello is a 48 y.o. female who presents with left facial, neck and arm pain.    HPI:     Seen in f/u for left facial, neck and arm pain.    Problem #1:  Sx stared several weeks ago.  Initially had sinus congestion.  Never had green or yellow secretions.  Has some chills but no known fever.   Since then her sx have progressed.  She is now having left side of her face pain, there is also left ear and TMJ area pain.  neck is sore with ROM.  She is also now having tingling and burning in her hands.   She works with a computer all day looking down.    No hx of injury.  No new exercises.  The left side of her face is tender to palp, no swelling or reddness.        Patient Active Problem List    Diagnosis Date Noted    Trigeminal neuralgia of left side of face 10/13/2017    TMJ pain dysfunction syndrome 06/26/2017    GERD (gastroesophageal reflux disease) 01/13/2016    Allergic rhinitis 02/06/2013    Tinnitus 11/08/2010    Proteinuria 03/24/2010    Hypothyroidism 03/19/2010    Scoliosis     Family history of breast cancer in mother 01/06/2010       Current medicines (including changes today)  Current Outpatient Medications   Medication Sig Dispense Refill    gabapentin (NEURONTIN) 100 MG Cap Take 1 Capsule by mouth at bedtime as needed (left face, neck and arm pain). 30 Capsule 0    diclofenac DR (VOLTAREN) 75 MG Tablet Delayed Response Take 1 Tablet by mouth 2 times a day as needed (left face, neck and arm pain w/tingling). 30 Tablet 0    levothyroxine (SYNTHROID) 50 MCG Tab TAKE 2 TABLETS BY MOUTH ONCE WEEKLY, AND TAKE 1 TABLET BY MOUTH DAILY ALL OTHER DAYS. 100 Tablet 0    Multiple Vitamins-Minerals (MULTIVITAL) TABS Take 1 Tab by mouth every day.      Cholecalciferol (VITAMIN D) 1000 UNIT CAPS Take 2 Caps by mouth every day.       No current facility-administered medications for this visit.       Allergies   Allergen Reactions    Amoxicillin Itching and Unspecified     Tight bumpy  "lips    Milk [Dairy Food Allergy]      ROS  Constitutional: Negative. Negative for fever, chills, weight loss, malaise/fatigue and diaphoresis.   HENT: Negative. Negative for hearing loss, ear pain, nosebleeds, sore throat, neck pain, tinnitus and ear discharge.   Respiratory: Negative. Negative for cough, hemoptysis, sputum production, shortness of breath, wheezing and stridor.   Cardiovascular: Negative. Negative for chest pain, palpitations, orthopnea, claudication, leg swelling and PND.   Gastrointestinal: Denies nausea, vomiting, diarrhea, constipation, heartburn, melena or hematochezia.  Genitourinary: Denies dysuria, hematuria, urinary incontinence, frequency or urgency.        Objective:     BP 98/58 (BP Location: Left arm, Patient Position: Sitting, BP Cuff Size: Adult)   Pulse 67   Temp 36.9 °C (98.4 °F) (Temporal)   Ht 1.54 m (5' 0.63\")   Wt 67.9 kg (149 lb 9.6 oz)   SpO2 94%  Body mass index is 28.61 kg/m².    Physical Exam:  Physical Exam   Vitals reviewed.  Constitutional: oriented to person, place, and time. appears well-developed and well-nourished. No distress.  Tender to palp left forehead and cheek.  Also tender in TMJ area.  Facial cranial nerves are intact.  No swelling  HENT:  Head: Normocephalic and atraumatic. Right Ear: External ear normal. Left Ear: External ear normal. Nose: Nose normal. Mouth/Throat: Oropharynx is clear and moist. No oropharyngeal exudate.  left tm occluded with wax, rt is wnl.  Eyes: Right eye exhibits no discharge. Left eye exhibits no discharge. No scleral icterus.  Neck: No JVD present.  Cardiovascular: Normal rate, regular rhythm, normal heart sounds and intact distal pulses.  Exam reveals no gallop and no friction rub.  No murmur heard.  No carotid bruits.   Pulmonary/Chest: Effort normal and breath sounds normal. No stridor. No respiratory distress. no wheezes or rales. exhibits no tenderness.   Musculoskeletal: Normal range of motion.   Lymphadenopathy: no " cervical or supraclavicular adenopathy.   Neurological: alert and oriented to person, place, and time. exhibits normal muscle tone. Coordination normal.   Skin: Skin is warm and dry. no diaphoresis.   Psychiatric: normal mood and affect. behavior is normal.          Assessment and Plan:     The following treatment plan was discussed:    1. Left facial pressure and pain  gabapentin (NEURONTIN) 100 MG Cap    diclofenac DR (VOLTAREN) 75 MG Tablet Delayed Response    DX-CERVICAL SPINE-2 OR 3 VIEWS    no sx of trigiminal neralgia, bells palsy, shingles.  xray cervical spine. f/u w/pt w/results. take voltaren am and neurontin hs. f/u if sxnot improved w/tx      2. Left arm pain  gabapentin (NEURONTIN) 100 MG Cap    diclofenac DR (VOLTAREN) 75 MG Tablet Delayed Response    DX-CERVICAL SPINE-2 OR 3 VIEWS    do updated lab and f/u 1 mo for review and sx eval      3. Numbness and tingling in left hand  gabapentin (NEURONTIN) 100 MG Cap    diclofenac DR (VOLTAREN) 75 MG Tablet Delayed Response    DX-CERVICAL SPINE-2 OR 3 VIEWS            Followup: Return in about 4 weeks (around 5/15/2024), or and in may to review lab and sx eval.

## 2024-04-24 ENCOUNTER — APPOINTMENT (OUTPATIENT)
Dept: MEDICAL GROUP | Facility: MEDICAL CENTER | Age: 49
End: 2024-04-24
Payer: COMMERCIAL

## 2024-04-30 ENCOUNTER — HOSPITAL ENCOUNTER (OUTPATIENT)
Dept: RADIOLOGY | Facility: MEDICAL CENTER | Age: 49
End: 2024-04-30
Attending: NURSE PRACTITIONER
Payer: COMMERCIAL

## 2024-04-30 DIAGNOSIS — R51.9 LEFT FACIAL PRESSURE AND PAIN: ICD-10-CM

## 2024-04-30 DIAGNOSIS — R20.2 NUMBNESS AND TINGLING IN LEFT HAND: ICD-10-CM

## 2024-04-30 DIAGNOSIS — M79.602 LEFT ARM PAIN: ICD-10-CM

## 2024-04-30 DIAGNOSIS — R20.0 NUMBNESS AND TINGLING IN LEFT HAND: ICD-10-CM

## 2024-05-01 ENCOUNTER — APPOINTMENT (OUTPATIENT)
Dept: MEDICAL GROUP | Facility: MEDICAL CENTER | Age: 49
End: 2024-05-01
Payer: COMMERCIAL

## 2024-05-02 ENCOUNTER — HOSPITAL ENCOUNTER (OUTPATIENT)
Dept: RADIOLOGY | Facility: MEDICAL CENTER | Age: 49
End: 2024-05-02
Attending: NURSE PRACTITIONER
Payer: COMMERCIAL

## 2024-05-02 DIAGNOSIS — Z12.31 SCREENING MAMMOGRAM, ENCOUNTER FOR: ICD-10-CM

## 2024-05-03 ENCOUNTER — HOSPITAL ENCOUNTER (OUTPATIENT)
Dept: LAB | Facility: MEDICAL CENTER | Age: 49
End: 2024-05-03
Attending: NURSE PRACTITIONER
Payer: COMMERCIAL

## 2024-05-03 DIAGNOSIS — Z13.0 SCREENING, ANEMIA, DEFICIENCY, IRON: ICD-10-CM

## 2024-05-03 DIAGNOSIS — Z13.1 SCREENING FOR DIABETES MELLITUS: ICD-10-CM

## 2024-05-03 DIAGNOSIS — Z13.220 SCREENING FOR HYPERLIPIDEMIA: ICD-10-CM

## 2024-05-03 DIAGNOSIS — E03.9 ACQUIRED HYPOTHYROIDISM: ICD-10-CM

## 2024-05-03 DIAGNOSIS — E55.9 VITAMIN D DEFICIENCY: ICD-10-CM

## 2024-05-03 DIAGNOSIS — Z13.89 SCREENING FOR MULTIPLE CONDITIONS: ICD-10-CM

## 2024-05-03 DIAGNOSIS — N93.8 DUB (DYSFUNCTIONAL UTERINE BLEEDING): ICD-10-CM

## 2024-05-03 LAB
25(OH)D3 SERPL-MCNC: 58 NG/ML (ref 30–100)
ALBUMIN SERPL BCP-MCNC: 4.2 G/DL (ref 3.2–4.9)
ALBUMIN/GLOB SERPL: 1.5 G/DL
ALP SERPL-CCNC: 67 U/L (ref 30–99)
ALT SERPL-CCNC: 11 U/L (ref 2–50)
ANION GAP SERPL CALC-SCNC: 11 MMOL/L (ref 7–16)
AST SERPL-CCNC: 26 U/L (ref 12–45)
BASOPHILS # BLD AUTO: 0.2 % (ref 0–1.8)
BASOPHILS # BLD: 0.01 K/UL (ref 0–0.12)
BILIRUB SERPL-MCNC: 0.4 MG/DL (ref 0.1–1.5)
BUN SERPL-MCNC: 10 MG/DL (ref 8–22)
CALCIUM ALBUM COR SERPL-MCNC: 9 MG/DL (ref 8.5–10.5)
CALCIUM SERPL-MCNC: 9.2 MG/DL (ref 8.5–10.5)
CHLORIDE SERPL-SCNC: 106 MMOL/L (ref 96–112)
CHOLEST SERPL-MCNC: 179 MG/DL (ref 100–199)
CO2 SERPL-SCNC: 21 MMOL/L (ref 20–33)
CREAT SERPL-MCNC: 0.74 MG/DL (ref 0.5–1.4)
EOSINOPHIL # BLD AUTO: 0.2 K/UL (ref 0–0.51)
EOSINOPHIL NFR BLD: 4.6 % (ref 0–6.9)
ERYTHROCYTE [DISTWIDTH] IN BLOOD BY AUTOMATED COUNT: 44.6 FL (ref 35.9–50)
EST. AVERAGE GLUCOSE BLD GHB EST-MCNC: 111 MG/DL
ESTRADIOL SERPL-MCNC: 234 PG/ML
FASTING STATUS PATIENT QL REPORTED: NORMAL
FSH SERPL-ACNC: 13.6 MIU/ML
GFR SERPLBLD CREATININE-BSD FMLA CKD-EPI: 99 ML/MIN/1.73 M 2
GLOBULIN SER CALC-MCNC: 2.8 G/DL (ref 1.9–3.5)
GLUCOSE SERPL-MCNC: 88 MG/DL (ref 65–99)
HBA1C MFR BLD: 5.5 % (ref 4–5.6)
HCT VFR BLD AUTO: 37.6 % (ref 37–47)
HDLC SERPL-MCNC: 80 MG/DL
HGB BLD-MCNC: 12.2 G/DL (ref 12–16)
IMM GRANULOCYTES # BLD AUTO: 0.01 K/UL (ref 0–0.11)
IMM GRANULOCYTES NFR BLD AUTO: 0.2 % (ref 0–0.9)
LDLC SERPL CALC-MCNC: 82 MG/DL
LH SERPL-ACNC: 17.8 IU/L
LYMPHOCYTES # BLD AUTO: 1.02 K/UL (ref 1–4.8)
LYMPHOCYTES NFR BLD: 23.3 % (ref 22–41)
MCH RBC QN AUTO: 27.8 PG (ref 27–33)
MCHC RBC AUTO-ENTMCNC: 32.4 G/DL (ref 32.2–35.5)
MCV RBC AUTO: 85.6 FL (ref 81.4–97.8)
MONOCYTES # BLD AUTO: 0.37 K/UL (ref 0–0.85)
MONOCYTES NFR BLD AUTO: 8.4 % (ref 0–13.4)
NEUTROPHILS # BLD AUTO: 2.77 K/UL (ref 1.82–7.42)
NEUTROPHILS NFR BLD: 63.3 % (ref 44–72)
NRBC # BLD AUTO: 0 K/UL
NRBC BLD-RTO: 0 /100 WBC (ref 0–0.2)
PLATELET # BLD AUTO: 188 K/UL (ref 164–446)
PMV BLD AUTO: 11.9 FL (ref 9–12.9)
POTASSIUM SERPL-SCNC: 4.4 MMOL/L (ref 3.6–5.5)
PROT SERPL-MCNC: 7 G/DL (ref 6–8.2)
RBC # BLD AUTO: 4.39 M/UL (ref 4.2–5.4)
SODIUM SERPL-SCNC: 138 MMOL/L (ref 135–145)
T4 FREE SERPL-MCNC: 1.2 NG/DL (ref 0.93–1.7)
TRIGL SERPL-MCNC: 85 MG/DL (ref 0–149)
TSH SERPL DL<=0.005 MIU/L-ACNC: 4.06 UIU/ML (ref 0.38–5.33)
WBC # BLD AUTO: 4.4 K/UL (ref 4.8–10.8)

## 2024-05-04 LAB
CREAT UR-MCNC: 32.61 MG/DL
MICROALBUMIN UR-MCNC: <1.2 MG/DL
MICROALBUMIN/CREAT UR: NORMAL MG/G (ref 0–30)

## 2024-05-15 ENCOUNTER — OFFICE VISIT (OUTPATIENT)
Dept: MEDICAL GROUP | Facility: MEDICAL CENTER | Age: 49
End: 2024-05-15
Payer: COMMERCIAL

## 2024-05-15 VITALS
BODY MASS INDEX: 29.02 KG/M2 | OXYGEN SATURATION: 99 % | TEMPERATURE: 99 F | HEART RATE: 80 BPM | DIASTOLIC BLOOD PRESSURE: 60 MMHG | SYSTOLIC BLOOD PRESSURE: 104 MMHG | WEIGHT: 153.7 LBS | HEIGHT: 61 IN

## 2024-05-15 DIAGNOSIS — R51.9 LEFT FACIAL PRESSURE AND PAIN: ICD-10-CM

## 2024-05-15 DIAGNOSIS — R51.9 LEFT FACIAL PAIN: ICD-10-CM

## 2024-05-15 PROCEDURE — 99214 OFFICE O/P EST MOD 30 MIN: CPT | Performed by: NURSE PRACTITIONER

## 2024-05-15 PROCEDURE — 3078F DIAST BP <80 MM HG: CPT | Performed by: NURSE PRACTITIONER

## 2024-05-15 PROCEDURE — 3074F SYST BP LT 130 MM HG: CPT | Performed by: NURSE PRACTITIONER

## 2024-05-15 RX ORDER — TIZANIDINE 4 MG/1
4 TABLET ORAL
Qty: 15 TABLET | Refills: 0 | Status: SHIPPED | OUTPATIENT
Start: 2024-05-15

## 2024-05-15 ASSESSMENT — FIBROSIS 4 INDEX: FIB4 SCORE: 2.04

## 2024-05-15 NOTE — PROGRESS NOTES
Subjective:     Koki Arguello is a 48 y.o. female who presents with left facial pain.    HPI:     Seen in f/u for left facial pain.    Problem #1:  She is still having left facial pain. Pain is mostly on left forehead.  It is radiating to parietal and occipital head.  She did massage and that helped temporarily.  There is also tingling in her scalp.  She has TMJ referral from her dentist.  She will set appt with them. She is also using a mouth guard.   Neurontin helps burning and tingling so she can sleep.  She is still doing exercise.  Cervical spine xray was wnl.  She is going to do another massage next wk.  She feels that it is helping more that PT would.  She is sl better with mouth guard.  She describes pain as starting in left side of neck radiating to behind ear, up over ear to parietal scalp, forehead and left cheek.  Was going into jaw but that has improved with mouth guard.      Lab results reviewed with pt:  GFR, vitamin D, TSH, T4, LP, A1C, CBC, CMP, alb/cr ratio is wnl  ESTRAdiol, FSH/LH not consistent with menopause.    Patient Active Problem List    Diagnosis Date Noted    Trigeminal neuralgia of left side of face 10/13/2017    TMJ pain dysfunction syndrome 06/26/2017    GERD (gastroesophageal reflux disease) 01/13/2016    Allergic rhinitis 02/06/2013    Tinnitus 11/08/2010    Proteinuria 03/24/2010    Hypothyroidism 03/19/2010    Scoliosis     Family history of breast cancer in mother 01/06/2010       Current medicines (including changes today)  Current Outpatient Medications   Medication Sig Dispense Refill    tizanidine (ZANAFLEX) 4 MG Tab Take 1 Tablet by mouth at bedtime. 15 Tablet 0    gabapentin (NEURONTIN) 100 MG Cap Take 1 Capsule by mouth at bedtime as needed (left face, neck and arm pain). 30 Capsule 0    diclofenac DR (VOLTAREN) 75 MG Tablet Delayed Response Take 1 Tablet by mouth 2 times a day as needed (left face, neck and arm pain w/tingling). 30 Tablet 0    levothyroxine  (SYNTHROID) 50 MCG Tab TAKE 2 TABLETS BY MOUTH ONCE WEEKLY, AND TAKE 1 TABLET BY MOUTH DAILY ALL OTHER DAYS. 100 Tablet 0    Multiple Vitamins-Minerals (MULTIVITAL) TABS Take 1 Tab by mouth every day.      Cholecalciferol (VITAMIN D) 1000 UNIT CAPS Take 2 Caps by mouth every day.       No current facility-administered medications for this visit.       Allergies   Allergen Reactions    Amoxicillin Itching and Unspecified     Tight bumpy lips    Milk [Dairy Food Allergy]        Hemoglobin A1c:  Lab Results   Component Value Date/Time    HBA1C 5.5 05/03/2024 07:47 AM    HBA1C 5.4 01/11/2023 11:09 AM    HBA1C 5.2 03/26/2010 11:42 AM       Microalbumin/creatinine Ratio:  Lab Results   Component Value Date/Time    URCREAT 32.61 05/03/2024 0744    MICROALBUR <1.2 05/03/2024 0744    MALBCRT see below 05/03/2024 0744       Lipid Panel:  Lab Results   Component Value Date/Time    CHOLSTRLTOT 179 05/03/2024 0747    TRIGLYCERIDE 85 05/03/2024 0747    LDL 82 05/03/2024 0747    CHOLHDLRAT 2.4 03/12/2010 0000       Thyroid:  Lab Results   Component Value Date/Time    TSHULTRASEN 4.060 05/03/2024 0747     Lab Results   Component Value Date/Time    FREET4 1.20 05/03/2024 0747       Complete Blood Count:  Lab Results   Component Value Date/Time    WBC 4.4 (L) 05/03/2024 0747    RBC 4.39 05/03/2024 0747    HEMOGLOBIN 12.2 05/03/2024 0747    HEMATOCRIT 37.6 05/03/2024 0747    MCV 85.6 05/03/2024 0747    MCH 27.8 05/03/2024 0747    MCHC 32.4 05/03/2024 0747    RDW 44.6 05/03/2024 0747    MPV 11.9 05/03/2024 0747    LYMPHOCYTES 23.30 05/03/2024 0747    LYMPHS 1.02 05/03/2024 0747    MONOCYTES 8.40 05/03/2024 0747    MONOS 0.37 05/03/2024 0747    EOSINOPHILS 4.60 05/03/2024 0747    EOS 0.20 05/03/2024 0747    BASOPHILS 0.20 05/03/2024 0747    BASO 0.01 05/03/2024 0747    NRBC 0.00 05/03/2024 0747       Complete Metabolic Panel:  Lab Results   Component Value Date/Time    SODIUM 138 05/03/2024 07:47 AM    POTASSIUM 4.4 05/03/2024 07:47 AM  "   CHLORIDE 106 05/03/2024 07:47 AM    CO2 21 05/03/2024 07:47 AM    ANION 11.0 05/03/2024 07:47 AM    GLUCOSE 88 05/03/2024 07:47 AM    BUN 10 05/03/2024 07:47 AM    CREATININE 0.74 05/03/2024 07:47 AM    CREATININE 0.83 03/12/2010 12:00 AM    ASTSGOT 26 05/03/2024 07:47 AM    ALTSGPT 11 05/03/2024 07:47 AM    TBILIRUBIN 0.4 05/03/2024 07:47 AM    ALBUMIN 4.2 05/03/2024 07:47 AM    TOTPROTEIN 7.0 05/03/2024 07:47 AM    GLOBULIN 2.8 05/03/2024 07:47 AM    AGRATIO 1.5 05/03/2024 07:47 AM         Vitamin D:    Lab Results   Component Value Date/Time    25HYDROXY 58 05/03/2024 0747       GFR:    Lab Results   Component Value Date/Time    GFRCKD 99 05/03/2024 0747       ROS  Constitutional: Negative. Negative for fever, chills, weight loss, malaise/fatigue and diaphoresis.   HENT: Negative. Negative for hearing loss, ear pain, nosebleeds, congestion, sore throat, neck pain, tinnitus and ear discharge.   Respiratory: Negative. Negative for cough, hemoptysis, sputum production, shortness of breath, wheezing and stridor.   Cardiovascular: Negative. Negative for chest pain, palpitations, orthopnea, claudication, leg swelling and PND.   Gastrointestinal: Denies nausea, vomiting, diarrhea, constipation, heartburn, melena or hematochezia.  Genitourinary: Denies dysuria, hematuria, urinary incontinence, frequency or urgency.        Objective:     /60 (BP Location: Left arm, Patient Position: Sitting, BP Cuff Size: Adult)   Pulse 80   Temp 37.2 °C (99 °F) (Temporal)   Ht 1.54 m (5' 0.63\")   Wt 69.7 kg (153 lb 11.2 oz)   SpO2 99%  Body mass index is 29.4 kg/m².    Physical Exam:  Vitals reviewed.  Constitutional: Oriented to person, place, and time. appears well-developed and well-nourished. No distress. Facial movements all =/+.  No facial drooping.  Sensation intact left face.  Cardiovascular: Normal rate, regular rhythm, normal heart sounds and intact distal pulses. Exam reveals no gallop and no friction rub. No " murmur heard. No carotid bruits.   Pulmonary/Chest: Effort normal and breath sounds normal. No stridor. No respiratory distress. no wheezes or rales. exhibits no tenderness.   Musculoskeletal: Normal range of motion. exhibits no edema. emelina pedal pulses 2+.  Lymphadenopathy: No cervical or supraclavicular adenopathy.   Neurological: Alert and oriented to person, place, and time. exhibits normal muscle tone.  Skin: Skin is warm and dry. No diaphoresis.   Psychiatric: Normal mood and affect. Behavior is normal.      Assessment and Plan:     The following treatment plan was discussed:    1. Left facial pain  tizanidine (ZANAFLEX) 4 MG Tab    use neurontin and tizanidine for pain/tingling control. f/u with TMJ specialist. continue massage.      2. Left facial pressure and pain  tizanidine (ZANAFLEX) 4 MG Tab    f/u if sx persist and do not improve after seeing TMJ specialist            Followup: Return in about 1 year (around 5/15/2025), or or sooner if facial pain continues after seeing TMJ specialist.

## 2024-05-20 ENCOUNTER — OFFICE VISIT (OUTPATIENT)
Dept: MEDICAL GROUP | Facility: MEDICAL CENTER | Age: 49
End: 2024-05-20
Payer: COMMERCIAL

## 2024-05-20 VITALS
HEART RATE: 68 BPM | DIASTOLIC BLOOD PRESSURE: 62 MMHG | OXYGEN SATURATION: 100 % | BODY MASS INDEX: 28.64 KG/M2 | SYSTOLIC BLOOD PRESSURE: 102 MMHG | TEMPERATURE: 98.8 F | WEIGHT: 151.7 LBS | HEIGHT: 61 IN

## 2024-05-20 DIAGNOSIS — R20.2 NUMBNESS AND TINGLING IN LEFT HAND: ICD-10-CM

## 2024-05-20 DIAGNOSIS — R20.0 NUMBNESS AND TINGLING IN LEFT HAND: ICD-10-CM

## 2024-05-20 DIAGNOSIS — R51.9 LEFT FACIAL PRESSURE AND PAIN: ICD-10-CM

## 2024-05-20 DIAGNOSIS — R55 SYNCOPE AND COLLAPSE: ICD-10-CM

## 2024-05-20 DIAGNOSIS — G44.89 OTHER HEADACHE SYNDROME: ICD-10-CM

## 2024-05-20 DIAGNOSIS — R51.9 LEFT FACIAL PAIN: ICD-10-CM

## 2024-05-20 PROCEDURE — 3074F SYST BP LT 130 MM HG: CPT | Performed by: NURSE PRACTITIONER

## 2024-05-20 PROCEDURE — 99214 OFFICE O/P EST MOD 30 MIN: CPT | Performed by: NURSE PRACTITIONER

## 2024-05-20 PROCEDURE — 3078F DIAST BP <80 MM HG: CPT | Performed by: NURSE PRACTITIONER

## 2024-05-20 ASSESSMENT — FIBROSIS 4 INDEX: FIB4 SCORE: 2.04

## 2024-05-21 DIAGNOSIS — R55 SYNCOPE AND COLLAPSE: ICD-10-CM

## 2024-05-21 DIAGNOSIS — G44.89 OTHER HEADACHE SYNDROME: ICD-10-CM

## 2024-05-21 DIAGNOSIS — R51.9 LEFT FACIAL PRESSURE AND PAIN: ICD-10-CM

## 2024-05-21 DIAGNOSIS — M79.602 LEFT ARM PAIN: ICD-10-CM

## 2024-05-21 DIAGNOSIS — R51.9 LEFT FACIAL PAIN: ICD-10-CM

## 2024-05-21 DIAGNOSIS — R20.2 NUMBNESS AND TINGLING IN LEFT HAND: ICD-10-CM

## 2024-05-21 DIAGNOSIS — R20.0 NUMBNESS AND TINGLING IN LEFT HAND: ICD-10-CM

## 2024-05-21 NOTE — PROGRESS NOTES
Subjective:     History of Present Illness  The patient is a 48-year-old female seen in follow-up for syncope.    The patient recounts an incident at a family gathering where she experienced an unusual sensation of gas in her stomach, followed by mild abdominal pain and discomfort. She was engaged in a large group with approximately 14 individuals. During this period, she was conversing with family and was attempting to grab her abdomen beneath the table. She eventually grabbed her  to take her to the car, after which she lost consciousness for approximately 30 seconds. Upon regaining consciousness, paramedics were contacted. Post-syncope, she did not experience confusion, but did experience a mild headache, which she did not experience during the episode. She reports that her  told her she had some jerking movements in her arms and hands when down.  She describes her head as feeling hot. She recalls experiencing lightheadedness upon standing and looking at a 3D computer last week, which she attributed to fatigue. The headache pain, which she describes as radiating to both sides of her head now that was initially only on left side of face and head. She is on voltaren, neurontin and tizanidine for the pain.  It has improved her pain only sl She has been maintaining a clean diet. Her  reported that she experienced some jerking, cold hands, and sweating. Upon waking, she felt cold again. Her blood pressure and oxygen saturation were assessed, but her blood pressure and heart rate were low. This is normal for her. Today, she reports a right-sided headache, which disrupted her sleep last night. Since the incident, she has been feeling well, with the exception of fatigue. She does not recall any palpitations post-syncope. Her usual blood pressure is low.  The patient reports that gabapentin has been beneficial in managing her tingling, particularly during sleep. She continues to experience numbness in  her left arm. She just had updated lab that was all wnl.        Results  none      Current medicines (including changes today)  Current Outpatient Medications   Medication Sig Dispense Refill    tizanidine (ZANAFLEX) 4 MG Tab Take 1 Tablet by mouth at bedtime. 15 Tablet 0    gabapentin (NEURONTIN) 100 MG Cap Take 1 Capsule by mouth at bedtime as needed (left face, neck and arm pain). 30 Capsule 0    diclofenac DR (VOLTAREN) 75 MG Tablet Delayed Response Take 1 Tablet by mouth 2 times a day as needed (left face, neck and arm pain w/tingling). 30 Tablet 0    levothyroxine (SYNTHROID) 50 MCG Tab TAKE 2 TABLETS BY MOUTH ONCE WEEKLY, AND TAKE 1 TABLET BY MOUTH DAILY ALL OTHER DAYS. 100 Tablet 0    Multiple Vitamins-Minerals (MULTIVITAL) TABS Take 1 Tab by mouth every day.      Cholecalciferol (VITAMIN D) 1000 UNIT CAPS Take 2 Caps by mouth every day.       No current facility-administered medications for this visit.     She  has a past medical history of Allergy, Family history of breast cancer in mother, GERD (gastroesophageal reflux disease), Hypothyroidism, Proteinuria, Scoliosis, Temporomandibular joint-pain-dysfunction syndrome (TMJ), Tinnitus, and Trigeminal neuralgia of left side of face.        ROS   Review of Systems   Constitutional: Negative.  Negative for fever, chills, weight loss, malaise/fatigue and diaphoresis.   HENT: Negative.  Negative for hearing loss, ear pain, nosebleeds, congestion, sore throat, neck pain, tinnitus and ear discharge.    Respiratory: Negative.  Negative for cough, hemoptysis, sputum production, shortness of breath, wheezing and stridor.    Cardiovascular: Negative.  Negative for chest pain, palpitations, orthopnea, claudication, leg swelling and PND.   Gastrointestinal: denies nausea, vomiting, diarrhea, constipation, heartburn, melena or hematochezia.  Genitourinary: Denies dysuria, hematuria, urinary incontinence, frequency or urgency.    Musculoskeletal: Negative.  Negative for  "myalgias and back pain.   Neurological: Negative.  Negative for dizziness, tingling, tremors, weakness and headaches.   Psych:  Denies depression, anxiety or insomnia.  All other systems reviewed and are negative.         Objective:     /62 (BP Location: Left arm, Patient Position: Sitting, BP Cuff Size: Adult)   Pulse 68   Temp 37.1 °C (98.8 °F) (Temporal)   Ht 1.54 m (5' 0.63\")   Wt 68.8 kg (151 lb 11.2 oz)   SpO2 100%  Body mass index is 29.01 kg/m².   Physical Exam    Physical Exam   Vitals reviewed.  Constitutional: oriented to person, place, and time. appears well-developed and well-nourished. No distress.   Neck: No JVD present.  Cardiovascular: Normal rate, regular rhythm, normal heart sounds and intact distal pulses.  Exam reveals no gallop and no friction rub.  No murmur heard.  No carotid bruits.   Pulmonary/Chest: Effort normal and breath sounds normal. No stridor. No respiratory distress. no wheezes or rales. exhibits no tenderness.   Musculoskeletal: Normal range of motion. exhibits no edema. wilmer pedal pulses 2+.  Lymphadenopathy: no cervical or supraclavicular adenopathy.   Neurological: alert and oriented to person, place, and time. exhibits normal muscle tone. Coordination normal.   CN 2-11 grossly intact. PERRLA.  Wilmer knee reflexes 2+  Skin: Skin is warm and dry. no diaphoresis.   Psychiatric: normal mood and affect. behavior is normal.           Assessment and Plan:   The following treatment plan was discussed      Assessment & Plan  1. Syncope of unknown etiology.  An urgent MRI of the brain, both with and without contrast, will be ordered. Additionally, an urgent referral to neurology will be made.    2. Numbness and tingling in the left hand and left facial flank pain.  An MRI of the cervical spine will be ordered.    Follow-up  The patient is scheduled for a follow-up visit upon completion of the MRI.      ORDERS:  1. Syncope and collapse  MR-BRAIN-WITH & W/O    Referral to " Neurology      2. Other headache syndrome  MR-BRAIN-WITH & W/O    Referral to Neurology      3. Left facial pressure and pain  MR-BRAIN-WITH & W/O    Referral to Neurology      4. Left facial pain  MR-CERVICAL SPINE-W/O      5. Numbness and tingling in left hand  MR-CERVICAL SPINE-W/O                  Please note that this dictation was created using voice recognition software. I have made every reasonable attempt to correct obvious errors, but I expect that there are errors of grammar and possibly content that I did not discover before finalizing the note.      Attestation      Verbal consent was acquired by the patient to use [employer] ambient listening note generation during this visit Yes

## 2024-05-31 ENCOUNTER — HOSPITAL ENCOUNTER (OUTPATIENT)
Dept: RADIOLOGY | Facility: MEDICAL CENTER | Age: 49
End: 2024-05-31
Attending: NURSE PRACTITIONER
Payer: COMMERCIAL

## 2024-05-31 DIAGNOSIS — G44.89 OTHER HEADACHE SYNDROME: ICD-10-CM

## 2024-05-31 DIAGNOSIS — M79.602 LEFT ARM PAIN: ICD-10-CM

## 2024-05-31 DIAGNOSIS — R55 SYNCOPE AND COLLAPSE: ICD-10-CM

## 2024-05-31 DIAGNOSIS — R51.9 LEFT FACIAL PRESSURE AND PAIN: ICD-10-CM

## 2024-05-31 DIAGNOSIS — R20.0 NUMBNESS AND TINGLING IN LEFT HAND: ICD-10-CM

## 2024-05-31 DIAGNOSIS — R51.9 LEFT FACIAL PAIN: ICD-10-CM

## 2024-05-31 DIAGNOSIS — R20.2 NUMBNESS AND TINGLING IN LEFT HAND: ICD-10-CM

## 2024-05-31 PROCEDURE — 72141 MRI NECK SPINE W/O DYE: CPT

## 2024-06-04 ENCOUNTER — HOSPITAL ENCOUNTER (OUTPATIENT)
Dept: RADIOLOGY | Facility: MEDICAL CENTER | Age: 49
End: 2024-06-04
Attending: NURSE PRACTITIONER
Payer: COMMERCIAL

## 2024-06-04 DIAGNOSIS — R51.9 LEFT FACIAL PRESSURE AND PAIN: ICD-10-CM

## 2024-06-04 DIAGNOSIS — R55 SYNCOPE AND COLLAPSE: ICD-10-CM

## 2024-06-04 DIAGNOSIS — R20.2 NUMBNESS AND TINGLING IN LEFT HAND: ICD-10-CM

## 2024-06-04 DIAGNOSIS — R20.0 NUMBNESS AND TINGLING IN LEFT HAND: ICD-10-CM

## 2024-06-04 DIAGNOSIS — G44.89 OTHER HEADACHE SYNDROME: ICD-10-CM

## 2024-06-04 DIAGNOSIS — M79.602 LEFT ARM PAIN: ICD-10-CM

## 2024-06-04 DIAGNOSIS — R51.9 LEFT FACIAL PAIN: ICD-10-CM

## 2024-06-04 PROCEDURE — 700117 HCHG RX CONTRAST REV CODE 255: Performed by: NURSE PRACTITIONER

## 2024-06-04 PROCEDURE — A9579 GAD-BASE MR CONTRAST NOS,1ML: HCPCS | Performed by: NURSE PRACTITIONER

## 2024-06-04 PROCEDURE — 70553 MRI BRAIN STEM W/O & W/DYE: CPT

## 2024-06-04 RX ADMIN — GADOTERIDOL 15 ML: 279.3 INJECTION, SOLUTION INTRAVENOUS at 14:35

## 2024-06-05 DIAGNOSIS — E03.9 ACQUIRED HYPOTHYROIDISM: ICD-10-CM

## 2024-06-05 NOTE — TELEPHONE ENCOUNTER
Received request via: Pharmacy    Was the patient seen in the last year in this department? Yes    Does the patient have an active prescription (recently filled or refills available) for medication(s) requested? No    [facility] Name[facility]     Does the patient have USP Plus and need 100 day supply (blood pressure, diabetes and cholesterol meds only)? Patient does not have SCP

## 2024-06-09 RX ORDER — LEVOTHYROXINE SODIUM 0.05 MG/1
TABLET ORAL
Qty: 100 TABLET | Refills: 3 | Status: SHIPPED | OUTPATIENT
Start: 2024-06-09

## 2024-06-13 ENCOUNTER — OFFICE VISIT (OUTPATIENT)
Dept: MEDICAL GROUP | Facility: MEDICAL CENTER | Age: 49
End: 2024-06-13
Payer: COMMERCIAL

## 2024-06-13 VITALS
OXYGEN SATURATION: 99 % | BODY MASS INDEX: 28.21 KG/M2 | HEART RATE: 66 BPM | TEMPERATURE: 97.7 F | DIASTOLIC BLOOD PRESSURE: 62 MMHG | HEIGHT: 61 IN | SYSTOLIC BLOOD PRESSURE: 110 MMHG | WEIGHT: 149.4 LBS

## 2024-06-13 DIAGNOSIS — M79.89 PARASPINAL SOFT TISSUE MASS: ICD-10-CM

## 2024-06-13 DIAGNOSIS — S09.90XA: ICD-10-CM

## 2024-06-13 PROCEDURE — 3074F SYST BP LT 130 MM HG: CPT | Performed by: NURSE PRACTITIONER

## 2024-06-13 PROCEDURE — 3078F DIAST BP <80 MM HG: CPT | Performed by: NURSE PRACTITIONER

## 2024-06-13 PROCEDURE — 99214 OFFICE O/P EST MOD 30 MIN: CPT | Performed by: NURSE PRACTITIONER

## 2024-06-13 ASSESSMENT — FIBROSIS 4 INDEX: FIB4 SCORE: 2.04

## 2024-06-14 NOTE — PROGRESS NOTES
Subjective:     History of Present Illness  The patient is a 48-year-old female seen in follow-up for abnormal MRI of the brain.    The patient recounts an incident in 12/2023 where she experienced a fainting episode, during which her  assisted her. Despite not hitting her head on the table, she quickly lost consciousness.  Currently, she experiences a sensation of fluid in her ear when lying on her left side, a symptom she previously experienced. She continues to experience left-sided headaches, albeit less frequently than before. She expresses concern about her vision, particularly post-work, as she perceives a three-dimensional view. She has experienced one episode of dizziness upon standing, necessitating rest since her syncopal episode.  She denies any history of hypertension. Approximately 20 years ago, she underwent an MRI following a skiing accident that resulted in a head injury. That MRI was wnl.  She continues to have the left side of her head pain daily.  She does not remember any head trauma.  However the current MRI shows abn that radiology states is d/t trauma.    She does not have any sx related to the paraspinal mass noted on her cervical MRI.  A f/u us is recommended.  She is agreeable to proceeding.        Results  Imaging  MRI of the brain shows a 1.2 cm sized collection of fluid in the left temporal lobe, nonspecific, new since the previous study, and correlates with recent history of trauma.     Cervical MRI   IMPRESSION:     1.  Unremarkable noncontrast MR examination of the cervical spine except for minimal mid cervical bulging.  2.  There is an approximately 33 x 7 mm sized T2 hyperintensity in the LEFT posterior paraspinal soft tissue abutting the posterior paraspinal muscles. Retrospective evaluation of the previous cervical spine MRI reveals small T2 hyperintensity at this   level. 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 If clinically  needed, Follow-up study with ultrasound may be helpful to ensure the stability.                   Current medicines (including changes today)  Current Outpatient Medications   Medication Sig Dispense Refill    levothyroxine (SYNTHROID) 50 MCG Tab TAKE TWO TABLETS BY MOUTH ONCE WEEKLY AND TAKE 1 TABLET BY MOUTH DAILY ON ALL OTHER DAYS 100 Tablet 3    tizanidine (ZANAFLEX) 4 MG Tab Take 1 Tablet by mouth at bedtime. 15 Tablet 0    gabapentin (NEURONTIN) 100 MG Cap Take 1 Capsule by mouth at bedtime as needed (left face, neck and arm pain). 30 Capsule 0    diclofenac DR (VOLTAREN) 75 MG Tablet Delayed Response Take 1 Tablet by mouth 2 times a day as needed (left face, neck and arm pain w/tingling). 30 Tablet 0    Multiple Vitamins-Minerals (MULTIVITAL) TABS Take 1 Tab by mouth every day.      Cholecalciferol (VITAMIN D) 1000 UNIT CAPS Take 2 Caps by mouth every day.       No current facility-administered medications for this visit.     She  has a past medical history of Allergy, Family history of breast cancer in mother, GERD (gastroesophageal reflux disease), Hypothyroidism, Proteinuria, Scoliosis, Temporomandibular joint-pain-dysfunction syndrome (TMJ), Tinnitus, and Trigeminal neuralgia of left side of face.      ROS   Review of Systems   Constitutional: Negative.  Negative for fever, chills, weight loss, malaise/fatigue and diaphoresis.   HENT: Negative.  Negative for hearing loss, ear pain, nosebleeds, congestion, sore throat, neck pain, tinnitus and ear discharge.    Respiratory: Negative.  Negative for cough, hemoptysis, sputum production, shortness of breath, wheezing and stridor.    Cardiovascular: Negative.  Negative for chest pain, palpitations, orthopnea, claudication, leg swelling and PND.   Gastrointestinal: denies nausea, vomiting, diarrhea, constipation, heartburn, melena or hematochezia.  Genitourinary: Denies dysuria, hematuria, urinary incontinence, frequency or urgency.    Musculoskeletal: Negative.  " Negative for myalgias and back pain.   Neurological: Negative.  Negative for dizziness, tingling, tremors, weakness and headaches.   Psych:  Denies depression, anxiety or insomnia.  All other systems reviewed and are negative.         Objective:     /62 (BP Location: Left arm, Patient Position: Sitting, BP Cuff Size: Adult)   Pulse 66   Temp 36.5 °C (97.7 °F) (Temporal)   Ht 1.54 m (5' 0.63\")   Wt 67.8 kg (149 lb 6.4 oz)   SpO2 99%  Body mass index is 28.57 kg/m².   Physical Exam    Physical Exam   Vitals reviewed.  Constitutional: oriented to person, place, and time. appears well-developed and well-nourished. No distress.   Neck supple. No JVD present.   Cardiovascular: Normal rate, regular rhythm, normal heart sounds and intact distal pulses.  Exam reveals no gallop and no friction rub.  No murmur heard.  No carotid bruits   Pulmonary/Chest: Effort normal and breath sounds normal. No stridor. No respiratory distress. no wheezes or rales. exhibits no tenderness.   Musculoskeletal: Normal range of motion. exhibits no edema or tenderness.  emelina pedal pulses 2+.  Lymphadenopathy:  no cervical or supraclavicular adenopathy.   Neurological: alert and oriented to person, place, and time. has normal reflexes. displays normal reflexes. No cranial nerve deficit. exhibits normal muscle tone. Coordination normal. PERRLA.   Skin: Skin is warm and dry. No rash noted. no diaphoresis. No erythema. No pallor.   Psychiatric: normal mood and affect. behavior is normal.           Assessment and Plan:   The following treatment plan was discussed      Assessment & Plan  1. Abnormal MRI of the brain.  The patient's heart rate remains within the normal range, and her blood pressure is consistently low. I will liaise with the radiologist to inform her of the necessity of a repeat MRI. Additionally, a nonvascular ultrasound of the thoracic area will be ordered. In the event of symptom exacerbation, such as severe headaches or " confusion, she is advised to seek immediate medical attention at the emergency room.      ORDERS:  1. Temporal head injury, initial encounter      i will contact radiologist to see how soon the repeat MRI should be.  i will notify her.  she will f/u with neuro as sched.      2. Paraspinal soft tissue mass  US-EXTREMITY NON VASCULAR UNILATERAL LEFT    do paraspinal thoracic us of mass.  f/u w/pt w/results.        3.  Addendum: spoke with radiology.  A 3 month f/u MRI is recommended.  I will contact her to schedule in mid september unless neuro has other recommendations.    Pt notified by [employer].          Please note that this dictation was created using voice recognition software. I have made every reasonable attempt to correct obvious errors, but I expect that there are errors of grammar and possibly content that I did not discover before finalizing the note.      Attestation      Verbal consent was acquired by the patient to use [employer] ambient listening note generation during this visit Yes

## 2024-06-22 DIAGNOSIS — S09.90XA: ICD-10-CM

## 2024-06-22 DIAGNOSIS — R55 SYNCOPE AND COLLAPSE: ICD-10-CM

## 2024-06-22 DIAGNOSIS — M79.89 PARASPINAL SOFT TISSUE MASS: ICD-10-CM

## 2024-07-04 ENCOUNTER — APPOINTMENT (OUTPATIENT)
Dept: RADIOLOGY | Facility: MEDICAL CENTER | Age: 49
End: 2024-07-04
Attending: NURSE PRACTITIONER
Payer: COMMERCIAL

## 2024-07-22 ENCOUNTER — HOSPITAL ENCOUNTER (OUTPATIENT)
Dept: RADIOLOGY | Facility: MEDICAL CENTER | Age: 49
End: 2024-07-22
Attending: NURSE PRACTITIONER
Payer: COMMERCIAL

## 2024-07-22 DIAGNOSIS — M79.89 PARASPINAL SOFT TISSUE MASS: ICD-10-CM

## 2024-07-22 PROCEDURE — 76705 ECHO EXAM OF ABDOMEN: CPT

## 2024-07-23 ENCOUNTER — OFFICE VISIT (OUTPATIENT)
Dept: NEUROLOGY | Facility: MEDICAL CENTER | Age: 49
End: 2024-07-23
Payer: COMMERCIAL

## 2024-07-23 VITALS
RESPIRATION RATE: 16 BRPM | TEMPERATURE: 98.9 F | OXYGEN SATURATION: 100 % | DIASTOLIC BLOOD PRESSURE: 62 MMHG | SYSTOLIC BLOOD PRESSURE: 104 MMHG | WEIGHT: 151.01 LBS | HEIGHT: 63 IN | HEART RATE: 73 BPM | BODY MASS INDEX: 26.76 KG/M2

## 2024-07-23 DIAGNOSIS — R55 SYNCOPE AND COLLAPSE: ICD-10-CM

## 2024-07-23 DIAGNOSIS — G43.101 MIGRAINE WITH AURA AND WITH STATUS MIGRAINOSUS, NOT INTRACTABLE: ICD-10-CM

## 2024-07-23 DIAGNOSIS — G44.85 PRIMARY STABBING HEADACHE: ICD-10-CM

## 2024-07-23 PROCEDURE — 3074F SYST BP LT 130 MM HG: CPT

## 2024-07-23 PROCEDURE — 99211 OFF/OP EST MAY X REQ PHY/QHP: CPT

## 2024-07-23 PROCEDURE — 3078F DIAST BP <80 MM HG: CPT

## 2024-07-23 PROCEDURE — 99417 PROLNG OP E/M EACH 15 MIN: CPT

## 2024-07-23 PROCEDURE — 99205 OFFICE O/P NEW HI 60 MIN: CPT

## 2024-07-23 RX ORDER — DEXAMETHASONE 2 MG/1
TABLET ORAL
Qty: 10 TABLET | Refills: 0 | Status: SHIPPED | OUTPATIENT
Start: 2024-07-23 | End: 2024-07-26

## 2024-07-23 ASSESSMENT — FIBROSIS 4 INDEX: FIB4 SCORE: 2.04

## 2024-08-17 ENCOUNTER — HOSPITAL ENCOUNTER (OUTPATIENT)
Dept: LAB | Facility: MEDICAL CENTER | Age: 49
End: 2024-08-17
Payer: COMMERCIAL

## 2024-08-17 DIAGNOSIS — R55 SYNCOPE AND COLLAPSE: ICD-10-CM

## 2024-08-17 LAB
ALBUMIN SERPL BCP-MCNC: 4.3 G/DL (ref 3.2–4.9)
ALBUMIN/GLOB SERPL: 1.4 G/DL
ALP SERPL-CCNC: 80 U/L (ref 30–99)
ALT SERPL-CCNC: 13 U/L (ref 2–50)
ANION GAP SERPL CALC-SCNC: 12 MMOL/L (ref 7–16)
AST SERPL-CCNC: 24 U/L (ref 12–45)
BILIRUB SERPL-MCNC: 0.4 MG/DL (ref 0.1–1.5)
BUN SERPL-MCNC: 15 MG/DL (ref 8–22)
CALCIUM ALBUM COR SERPL-MCNC: 9 MG/DL (ref 8.5–10.5)
CALCIUM SERPL-MCNC: 9.2 MG/DL (ref 8.5–10.5)
CHLORIDE SERPL-SCNC: 106 MMOL/L (ref 96–112)
CO2 SERPL-SCNC: 20 MMOL/L (ref 20–33)
CREAT SERPL-MCNC: 0.78 MG/DL (ref 0.5–1.4)
GFR SERPLBLD CREATININE-BSD FMLA CKD-EPI: 93 ML/MIN/1.73 M 2
GLOBULIN SER CALC-MCNC: 3.1 G/DL (ref 1.9–3.5)
GLUCOSE SERPL-MCNC: 86 MG/DL (ref 65–99)
POTASSIUM SERPL-SCNC: 4.1 MMOL/L (ref 3.6–5.5)
PROT SERPL-MCNC: 7.4 G/DL (ref 6–8.2)
SODIUM SERPL-SCNC: 138 MMOL/L (ref 135–145)

## 2024-08-17 PROCEDURE — 36415 COLL VENOUS BLD VENIPUNCTURE: CPT

## 2024-08-17 PROCEDURE — 80053 COMPREHEN METABOLIC PANEL: CPT

## 2024-09-04 ENCOUNTER — HOSPITAL ENCOUNTER (OUTPATIENT)
Dept: RADIOLOGY | Facility: MEDICAL CENTER | Age: 49
End: 2024-09-04
Attending: NURSE PRACTITIONER
Payer: COMMERCIAL

## 2024-09-04 DIAGNOSIS — S09.90XA: ICD-10-CM

## 2024-09-04 DIAGNOSIS — R55 SYNCOPE AND COLLAPSE: ICD-10-CM

## 2024-09-04 DIAGNOSIS — M79.89 PARASPINAL SOFT TISSUE MASS: ICD-10-CM

## 2024-09-04 PROCEDURE — A9579 GAD-BASE MR CONTRAST NOS,1ML: HCPCS | Mod: JZ | Performed by: NURSE PRACTITIONER

## 2024-09-04 PROCEDURE — 70553 MRI BRAIN STEM W/O & W/DYE: CPT

## 2024-09-04 PROCEDURE — 700117 HCHG RX CONTRAST REV CODE 255: Mod: JZ | Performed by: NURSE PRACTITIONER

## 2024-09-04 RX ADMIN — GADOTERIDOL 15 ML: 279.3 INJECTION, SOLUTION INTRAVENOUS at 16:02

## 2024-09-12 DIAGNOSIS — G93.89 MASS OF LEFT TEMPORAL LOBE: ICD-10-CM

## 2024-09-12 DIAGNOSIS — G93.89: ICD-10-CM

## 2024-09-20 NOTE — PROGRESS NOTES
[facility]  GENERAL NEUROLOGY  FOLLOW UP VISIT    HISTORY OF ILLNESS:  Koki Arguello is a 48 y.o. woman with a history most notable for headache.  She is here for follow-up to discuss her headaches and syncopal episodes.  At her last visit an EEG and a CMP was ordered.  Her primary provider ordered an MRI with and without contrast due to her previous MRI showing a cyst.  At her last visit she was also encouraged to try over-the-counter supplements.  Today, Koki provided the following interim history:    9/25/2024- She reports that her headaches have improved, her neck tension has gotten better. She is getting better sleep. She feels much better. She does has some slight pressure around her temples but the rest of her symptoms have resolved. She has not gotten the EEG but she report no one called for the EEG. She had her repeat MRI and it showed slight increase in her cyst and was referred to neurosurgeon.     Below information is gathered from previous appointment  Headache description:  Denies aura before headache. Headache started in the left temple  with a pressure radiation into the  left jaw and some muscle tension on left occipital ridge. Quality of headache is described as pulsating, sharp needle like sensation. Intensity of headache without medication 9/10.  Ibuprofen 800 mg 4-5/10, gabapentin 2-3/10. Sharp pain would last 20 minutes go away and come back occurring every 3-4 hours and stopped after the syncopal episode. Pulsating & pressure headache would last 2-3 hours  was having daily until syncopal episode then stopped. Associated symptoms: blurred vision, light sensitivity, smell sensitivity, temperature sensitivity, dizziness, left bottom lid swelling, numbness and tingling in fingers and feet, swallowing difficulty. Triggers stress, sleep deprivation, sugar, wine, screen  light, LED lights. Denies being able to trigger a headache with laughing, crying, sneezing or coughing. Can  trigger with sudden position change, bending, position change. Lasted for 2.5 months. She has some sensation of her headache still but not as severe. Headache occurs  more frequently in the evening and she reports waking up with the headache in the morning.    Syncopal episode:  She reports being at a restaurant after a memorial. She reports having sharp pain in her stomach. She went to the bathroom thinking it was gas. When she returned to the table she asked her  to take her to the car to lay down. She reports feeling very hot. She denies hans vu sensation. She reports that the food she was eating tasted off. She started to remove her jacket and lost consciousness. 911 was called she was unconscious for approximately 30 seconds. She does not remember anything from the event. She remembers events before she passed out and after she passed out. She did not wake up confused. Her  reported some jerking and diaphoresis.  EMS said her vital signs were low and encouraged her to go the the ER.     Hx right sided bells palsy  The following is a summary of headache symptoms, presented in my standard format:    Family History: daughter, daughters  Age at onset (years): 48 years old  Location: see above  Radiation: see above  Frequency: see above  Duration: see above  Headache Days/Month: April 15/30, May 30/30, June 14/30, July 0/23  Quality: see above  Intensity: see above  Aura: see above  Photophobia/Phonophobia/Nausea/Vomiting: yes, yes, yes, no  Provoked by Physical Activity?:yes  Triggers: see above  Associated Symptoms: see above  Autonomic Signs (such as ptosis, miosis, conjunctival injection, rhinorrhea, increased lacrimation): no  Head Trauma: Head injuries 2017  Association with Menses: no  ED Visits: no  Hospitalizations: no  Missed Work Days (): may missed 2-3 days  Sleep (hours/night): 6 hours a night  Caffeine Intake: 1 cup of coffee, 2 cokes during a migraine   Hydration:  yes  Nutrition: occasionally skips meals  Exercise: yes- does not trigger migraines  Analgesic Overuse: Ibuprofen 600 mg down to once every few weeks from 800 mg three times a day for 2.5 months    Current Medication Regimen:      Medications Tried: Response  Preventive:  - gabapentin    Rescue:  -     Medications Not Tried:  -     MEDICAL AND SURGICAL HISTORY:  Past Medical History:   Diagnosis Date    Allergy     Family history of breast cancer in mother     GERD (gastroesophageal reflux disease)     Hypothyroidism     Proteinuria     ? etiology.  renal us wnl    Scoliosis     Temporomandibular joint-pain-dysfunction syndrome (TMJ)     Tinnitus     Trigeminal neuralgia of left side of face      Past Surgical History:   Procedure Laterality Date    GANGLION EXCISION  9/22/08    Performed by LYNETTE HUBER at SURGERY SAME DAY [facility] ORS    GANGLION EXCISION      rt wrist     MEDICATIONS:  Current Outpatient Medications   Medication Sig    levothyroxine (SYNTHROID) 50 MCG Tab TAKE TWO TABLETS BY MOUTH ONCE WEEKLY AND TAKE 1 TABLET BY MOUTH DAILY ON ALL OTHER DAYS    Multiple Vitamins-Minerals (MULTIVITAL) TABS Take 1 Tab by mouth every day.    Cholecalciferol (VITAMIN D) 1000 UNIT CAPS Take 2 Caps by mouth every day.     SOCIAL HISTORY:  Social History     Tobacco Use    Smoking status: Never    Smokeless tobacco: Never   Substance Use Topics    Alcohol use: Yes     Alcohol/week: 0.0 oz     Comment: occasional     Social History     Social History Narrative    Not on file     FAMILY HISTORY:  Family History   Problem Relation Age of Onset    Cancer Mother         breast    Heart Disease Father     Diabetes Maternal Grandmother     Hypertension Maternal Grandmother     Diabetes Paternal Grandmother        Ambulatory Vitals      REVIEW OF SYSTEMS:  A ROS was completed.  Pertinent positives and negatives were included in the HPI, above.  All other systems were reviewed and are negative.    PHYSICAL  EXAM:  General/Medical:  Koki presents clean and well-dressed.  She does not appear in any acute distress at this time.  She does have a slight right-sided facial droop.  She reports the facial droop occurred after Bell's palsy she had a few years ago.  She reports no jaw claudication or jaw pain.  She reports some scalp allodynia.  She has no malar rash.  She has good range of motion of her neck.  She has no upper or lower extremity edema.  Vital signs are listed above and are within normal limits.    Neuro:  MENTAL STATUS: awake and alert; no deficits of speech or language; oriented to person, place, and time; affect was appropriate to situation    CRANIAL NERVES:    II: acuity: JNT/JNT, fields: intact to confrontation, pupils: 3/3 to 2/2 without a relative afferent pupillary defect, discs: NT    III/IV/VI: versions: intact without nystagmus    V: facial sensation: NT    VII: facial expression: Slight right-sided facial droop    VIII: hearing: intact to finger rub    IX/X: palate: elevates symmetrically    XI: shoulder shrug: symmetric    XII: tongue: midline    MOTOR:  - bulk: NT  - tone: NT  Upper Extremity Strength  (R/L)    NT   Elbow flexion NT   Elbow extension NT   Shoulder abduction NT     Lower Extremity Strength  (R/L)   Hip flexion NT   Knee extension NT   Knee flexion NT   Ankle plantarflexion NT   Ankle dorsiflexion NT     - pronator drift: NT  - abnormal movements: none    SENSATION:  - light touch: NT  - vibration:NT  - temperature: NT  - pinprick: NT  - proprioception: NT  - Romberg: NT    COORDINATION:  - finger to nose: NT  - finger tapping: NT  - foot tapping: NT    REFLEXES:  Reflex Right Left   BR NT NT   Biceps NT NT   Triceps NT NT   Patellae NT NT   Achilles NT NT   Toes NT NT     GAIT:  - narrow base and normal, with normal arm swing  - heel-walk: Intact  - toe-walk: Intact  - tandem gait: intact    REVIEW OF IMAGING STUDIES: I reviewed the following studies:  MRI Brain:  Date:  7/22/24  W/o and w/ contrast?: with and without  Indication: Left facial and cranial pain and tingling.   Comparison: 12/11/2017   Impression:  .  There is an approximately 1.2 cm sized extra-axial well-defined T2 hyperintense area noted adjacent to the LEFT temporal lobe. This minimal peripheral enhancement. This is a nonspecific finding. This is new since the previous study . This may   represent a small extra-axial fluid collection. These correlate with the recent history of trauma. Follow-up study is recommended to ensure the stability/resolution.  2.  There are few punctate nonspecific supratentorial T2 hyperintensities likely representing nonspecific foci of gliosis. There has been no significant interval change. There are no periventricular T2 hyperintensities    MRI brain:  Date: 9/4/2024  With or without contrast: With and without  Indication: Cyst on previous MRI  Comparison: 7/22/2024  Impression:  1.  There is an approximately 13 x 9 mm sized ,well defined, T2 hyperintense, T1 hypointense area noted along the left temporal gray matter.The post gadolinium sequences demonstrates tiny mural enhancement along the inferior portion of the lesion.There   is also minimal peripheral enhancement.  When compared with the previous MRI there has been mild interval increase in the size of the lesion. 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 The other differential   diagnosis includes cysticercosis.Neurosurgical consultation is recommended.  2.  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 Migraine with aura and with status migrainosus, not intractable  Koki presents for follow-up to discuss her current medication regimen and its effect on her  migraines and headaches.  At her last appointment she was having nonstop migraines and primary stabbing headaches.  She reports after the short burst of steroids and some sinus draining all of her symptoms resolved.  She does have some lingering pressure in the area of her migraine presentation otherwise everything has resolved.  She has not reported any further syncopal episodes since that 1 isolated incident.  She does report that she has been not been contacted for the EEG so I will reorder so she can have a EEG performed.  She did have her second MRI performed of her brain evaluating that cyst that is adjacent to the left temporal lobe.  In the 2-month interim between MRIs did show a slight enhancement causing the radiologist to be concerned for a cortical-based cystic nonaggressive neoplasm.  Her primary provider referred her to neurosurgery for further evaluation.  We did briefly discuss the importance of a neurosurgery evaluation to decide whether or not it can be monitored or needs surgery.  Since her migraines have resolved and so has her syncopal episode Koki can follow-up with me as needed.  If she does have increased syncopal episode she needs to notify me immediately via Jibo so I can refer her to an epileptologist for further evaluation.  She does have the choice of seeing me yearly to in case she does start having migraines again.  If she does she can follow-up with me next July or as needed.  She can contact me via Jibo with any updates, concerns, or questions.  I will contact her with the results of the EEG via Jibo.  Otherwise I will see her either in a year or as needed.  - Referral to Neurodiagnostics (EEG,EP,EMG/NCS/DBS)    BILLING DOCUMENTATION:   I spent 20 minutes in patient care. This includes time with chart review, pre-charting, records review, discussions with other healthcare providers, and documentation. This also includes face-to-face time with the patient for: exam review,  discussion and treatment planning.      Reyna Galindo MSN APRN-CNP  [facility] [place]

## 2024-09-25 ENCOUNTER — OFFICE VISIT (OUTPATIENT)
Dept: NEUROLOGY | Facility: MEDICAL CENTER | Age: 49
End: 2024-09-25
Payer: COMMERCIAL

## 2024-09-25 VITALS
HEIGHT: 63 IN | HEART RATE: 64 BPM | TEMPERATURE: 98.7 F | RESPIRATION RATE: 15 BRPM | DIASTOLIC BLOOD PRESSURE: 68 MMHG | WEIGHT: 150.13 LBS | OXYGEN SATURATION: 99 % | SYSTOLIC BLOOD PRESSURE: 112 MMHG | BODY MASS INDEX: 26.6 KG/M2

## 2024-09-25 DIAGNOSIS — R55 SYNCOPE AND COLLAPSE: ICD-10-CM

## 2024-09-25 DIAGNOSIS — G43.101 MIGRAINE WITH AURA AND WITH STATUS MIGRAINOSUS, NOT INTRACTABLE: ICD-10-CM

## 2024-09-25 PROCEDURE — 99211 OFF/OP EST MAY X REQ PHY/QHP: CPT

## 2024-09-25 PROCEDURE — 3074F SYST BP LT 130 MM HG: CPT

## 2024-09-25 PROCEDURE — 99213 OFFICE O/P EST LOW 20 MIN: CPT

## 2024-09-25 PROCEDURE — 3078F DIAST BP <80 MM HG: CPT

## 2024-09-25 ASSESSMENT — PATIENT HEALTH QUESTIONNAIRE - PHQ9: CLINICAL INTERPRETATION OF PHQ2 SCORE: 0

## 2024-09-25 ASSESSMENT — FIBROSIS 4 INDEX: FIB4 SCORE: 1.73

## 2024-12-23 ENCOUNTER — OFFICE VISIT (OUTPATIENT)
Dept: MEDICAL GROUP | Facility: MEDICAL CENTER | Age: 49
End: 2024-12-23
Payer: COMMERCIAL

## 2024-12-23 VITALS
SYSTOLIC BLOOD PRESSURE: 100 MMHG | TEMPERATURE: 97 F | WEIGHT: 148 LBS | HEIGHT: 63 IN | OXYGEN SATURATION: 100 % | DIASTOLIC BLOOD PRESSURE: 70 MMHG | HEART RATE: 63 BPM | BODY MASS INDEX: 26.22 KG/M2

## 2024-12-23 DIAGNOSIS — G43.909 MIGRAINE WITHOUT STATUS MIGRAINOSUS, NOT INTRACTABLE, UNSPECIFIED MIGRAINE TYPE: ICD-10-CM

## 2024-12-23 DIAGNOSIS — R20.2 INTERMITTENT TINGLING SENSATION OF LEFT HAND AND FOOT: ICD-10-CM

## 2024-12-23 DIAGNOSIS — R10.13 EPIGASTRIC PAIN: ICD-10-CM

## 2024-12-23 DIAGNOSIS — Z12.11 SCREENING FOR MALIGNANT NEOPLASM OF COLON: ICD-10-CM

## 2024-12-23 PROCEDURE — 3078F DIAST BP <80 MM HG: CPT | Performed by: NURSE PRACTITIONER

## 2024-12-23 PROCEDURE — 3074F SYST BP LT 130 MM HG: CPT | Performed by: NURSE PRACTITIONER

## 2024-12-23 PROCEDURE — 99214 OFFICE O/P EST MOD 30 MIN: CPT | Performed by: NURSE PRACTITIONER

## 2024-12-23 RX ORDER — SUCRALFATE 1 G/1
1 TABLET ORAL
Qty: 120 TABLET | Refills: 0 | Status: SHIPPED | OUTPATIENT
Start: 2024-12-23

## 2024-12-23 ASSESSMENT — FIBROSIS 4 INDEX: FIB4 SCORE: 1.73

## 2024-12-24 NOTE — PROGRESS NOTES
Subjective:     History of Present Illness  The patient is a 48-year-old female seen in follow-up for epigastric abdominal pain.    She reports experiencing symptoms similar to those she had during a previous H. pylori infection, including bloating, constipation, and hunger pangs. These symptoms have been present for the past 3 weeks. She describes her pain as intense, akin to a hernia, although she has never had one. The pain is localized in the middle of her abdomen and radiates upwards in to her esophagus, accompanied by burping. She also notes a change in stool color, with some days being black and others dark brown. She admits to mild constipation and has been increasing her fluid intake, primarily green tea. She retains her gallbladder. She has been referred to GI consultants but has not yet scheduled an appointment due to concurrent management of her headaches. She has been managing these symptoms with antacids and Tums, the latter of which was last taken yesterday. She has found relief from drinking lemon juice with baking soda and club soda. She has also increased her intake of oats and probiotics.  She has not had the pain in a week now.  The painful area was tender when having the pain but that has also resolved.    She has been diagnosed with 2 different types of migraines by her neurologist, which have significantly reduced in frequency. She experiences occasional mild headaches. She was offered medication but declined due to potential side effects.     She has a cystic lesion in her left temporal area.  She underwent an MRI on 12/18/2024 but we don't have this result since was done at [facility].  The previous 2 MRI's in june and september, which revealed a small lesion in the left temple area that per the neurosurgeon should not be causing her symptoms of tingling and numbness, particularly on the left side. She has an upcoming appointment with her neurosurgeon in 01/2025. She has made  dietary changes to manage inflammation, resulting in improving the occasional numbness in her left arm and mild tingling. She reports no neck pain.    MEDICATIONS  Current: Tums    Results  - Imaging MRI brain: 9/5/24  1.  There is an approximately 13 x 9 mm sized ,well defined, T2 hyperintense, T1 hypointense area noted along the left temporal gray matter.The post gadolinium sequences demonstrates tiny mural enhancement along the inferior portion of the lesion.There   is also minimal peripheral enhancement.  When compared with the previous MRI there has been mild interval increase in the size of the lesion. 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 The other differential   diagnosis includes cysticercosis.Neurosurgical consultation is recommended.  2.  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    MRI brain 6/4/24:   1.  There is an approximately 1.2 cm sized extra-axial well-defined T2 hyperintense area noted adjacent to the LEFT temporal lobe. This minimal peripheral enhancement. This is a nonspecific finding. This is new since the previous study . This may   represent a small extra-axial fluid collection. These correlate with the recent history of trauma. Follow-up study is recommended to ensure the stability/resolution.  2.  There are few punctate nonspecific supratentorial T2 hyperintensities likely representing nonspecific foci of gliosis. There has been no significant interval change. There are no periventricular T2 hyperintensities.     Current medicines (including changes today)  Current Outpatient Medications   Medication Sig Dispense Refill    omeprazole (PRILOSEC) 20 MG delayed-release capsule Take 1 Capsule by mouth every day. 30 Capsule 1    sucralfate (CARAFATE) 1 GM Tab Take 1 Tablet by mouth 4 Times a Day,Before Meals and at Bedtime. 120 Tablet 0    Pseudoephedrine HCl (BL 12 HOUR COLD PO) Take  by mouth.  "     levothyroxine (SYNTHROID) 50 MCG Tab TAKE TWO TABLETS BY MOUTH ONCE WEEKLY AND TAKE 1 TABLET BY MOUTH DAILY ON ALL OTHER DAYS 100 Tablet 3    Multiple Vitamins-Minerals (MULTIVITAL) TABS Take 1 Tab by mouth every day.      Cholecalciferol (VITAMIN D) 1000 UNIT CAPS Take 2 Caps by mouth every day.       No current facility-administered medications for this visit.     She  has a past medical history of Allergy, Family history of breast cancer in mother, GERD (gastroesophageal reflux disease), Hypothyroidism, Proteinuria, Scoliosis, Temporomandibular joint-pain-dysfunction syndrome (TMJ), Tinnitus, and Trigeminal neuralgia of left side of face.      ROS   Review of Systems   Constitutional: Negative.  Negative for fever, chills, weight loss, malaise/fatigue and diaphoresis.   HENT: Negative.  Negative for hearing loss, ear pain, nosebleeds, congestion, sore throat, neck pain, tinnitus and ear discharge.    Respiratory: Negative.  Negative for cough, hemoptysis, sputum production, shortness of breath, wheezing and stridor.    Cardiovascular: Negative.  Negative for chest pain, palpitations, orthopnea, claudication, leg swelling and PND.   Gastrointestinal: denies nausea, vomiting, diarrhea, heartburn, melena or hematochezia.  Genitourinary: Denies dysuria, hematuria, urinary incontinence, frequency or urgency.    Musculoskeletal: Negative.  Negative for myalgias and back pain.   Neurological: Negative.  Negative for dizziness, tremors, weakness.   Psych:  Denies depression, anxiety or insomnia.  All other systems reviewed and are negative.         Objective:     /70   Pulse 63   Temp 36.1 °C (97 °F) (Temporal)   Ht 1.6 m (5' 3\")   Wt 67.1 kg (148 lb)   SpO2 100%  Body mass index is 26.22 kg/m².   Physical Exam  Lungs were auscultated.  Abdomen was examined.  Feet were examined.  Physical Exam   Vitals reviewed.  Constitutional: oriented to person, place, and time. appears well-developed and " well-nourished. No distress.   Neck: No JVD present.  Cardiovascular: Normal rate, regular rhythm, normal heart sounds and intact distal pulses.  Exam reveals no gallop and no friction rub.  No murmur heard.  No carotid bruits.   Pulmonary/Chest: Effort normal and breath sounds normal. No stridor. No respiratory distress. no wheezes or rales. exhibits no tenderness.   Musculoskeletal: Normal range of motion. exhibits no edema. emelina pedal pulses 2+.  Lymphadenopathy: no cervical or supraclavicular adenopathy.   Abd:  No CVAT,  Soft,  Bs noted in all quadrants.  No HSM.  No abdominal tenderness.  Neurological: alert and oriented to person, place, and time. exhibits normal muscle tone. Coordination normal.   Skin: Skin is warm and dry. no diaphoresis.   Psychiatric: normal mood and affect. behavior is normal.       Assessment and Plan:   The following treatment plan was discussed      Assessment & Plan  1. Epigastric pain.  Her symptoms suggest a possible diagnosis of GERD or H. pylori infection. An H. pylori stool test will be conducted. A referral to gastroenterology has been initiated since she is also due updated colonoscopy. If the H. pylori test is negative, she is advised to commence omeprazole 20 mg once daily for 2 months and sucralfate 1 g four times daily for 1 month if her pain reoccurs. If the H. pylori test is positive, antibiotics will be prescribed.    2. Migraines.  She reports a significant reduction in the frequency of migraines. No new medication is initiated as she prefers to manage without it due to potential side effects.  She is followed by neurology    3. Left-sided numbness and tingling.  She reports mild numbness and tingling in her left arm, which has improved. No new treatment is required at this time.    4. Brain lesion left temple.  She has a small benign-appearing cystic lesion in the inferior left temporal region. A repeat MRI was done recently by neurosurgery.  She is scheduled to see  them in January to monitor any changes. So far neurosurgery has not recommended surgery despite 9/24 MRI suggesting neoplastic etiology.      ORDERS:  1. Migraine without status migrainosus, not intractable, unspecified migraine type      2. Intermittent tingling sensation of left hand and foot      3. Epigastric pain    - H.PYLORI STOOL ANTIGEN; Future  - omeprazole (PRILOSEC) 20 MG delayed-release capsule; Take 1 Capsule by mouth every day.  Dispense: 30 Capsule; Refill: 1  - sucralfate (CARAFATE) 1 GM Tab; Take 1 Tablet by mouth 4 Times a Day,Before Meals and at Bedtime.  Dispense: 120 Tablet; Refill: 0  - Referral to Gastroenterology    4. Screening for malignant neoplasm of colon    - Referral to Gastroenterology          Please note that this dictation was created using voice recognition software. I have made every reasonable attempt to correct obvious errors, but I expect that there are errors of grammar and possibly content that I did not discover before finalizing the note.      Attestation      Verbal consent was acquired by the patient to use [id number] ambient listening note generation during this visit Yes

## 2024-12-27 ENCOUNTER — HOSPITAL ENCOUNTER (OUTPATIENT)
Facility: MEDICAL CENTER | Age: 49
End: 2024-12-27
Attending: NURSE PRACTITIONER
Payer: COMMERCIAL

## 2024-12-27 PROCEDURE — 87338 HPYLORI STOOL AG IA: CPT

## 2024-12-28 DIAGNOSIS — A04.8 H. PYLORI INFECTION: ICD-10-CM

## 2024-12-28 DIAGNOSIS — R10.13 EPIGASTRIC PAIN: ICD-10-CM

## 2024-12-28 LAB — H PYLORI AG STL QL IA: DETECTED

## 2024-12-28 RX ORDER — LANSOPRAZOLE, AMOXICILLIN, CLARITHROMYCIN 30-500-500
1 KIT ORAL
Qty: 1 EACH | Refills: 0 | Status: SHIPPED | OUTPATIENT
Start: 2024-12-28

## 2025-05-20 ENCOUNTER — RESULTS FOLLOW-UP (OUTPATIENT)
Dept: MEDICAL GROUP | Facility: MEDICAL CENTER | Age: 50
End: 2025-05-20

## 2025-05-20 ENCOUNTER — HOSPITAL ENCOUNTER (OUTPATIENT)
Dept: RADIOLOGY | Facility: MEDICAL CENTER | Age: 50
End: 2025-05-20
Attending: FAMILY MEDICINE
Payer: COMMERCIAL

## 2025-05-20 ENCOUNTER — HOSPITAL ENCOUNTER (OUTPATIENT)
Facility: MEDICAL CENTER | Age: 50
End: 2025-05-20
Attending: FAMILY MEDICINE
Payer: COMMERCIAL

## 2025-05-20 ENCOUNTER — OFFICE VISIT (OUTPATIENT)
Dept: MEDICAL GROUP | Facility: MEDICAL CENTER | Age: 50
End: 2025-05-20
Payer: COMMERCIAL

## 2025-05-20 VITALS
WEIGHT: 140 LBS | OXYGEN SATURATION: 97 % | HEART RATE: 74 BPM | HEIGHT: 63 IN | TEMPERATURE: 98.6 F | DIASTOLIC BLOOD PRESSURE: 62 MMHG | SYSTOLIC BLOOD PRESSURE: 100 MMHG | BODY MASS INDEX: 24.8 KG/M2

## 2025-05-20 DIAGNOSIS — R20.2 NUMBNESS AND TINGLING OF LEG: ICD-10-CM

## 2025-05-20 DIAGNOSIS — R07.89 CHEST PRESSURE: ICD-10-CM

## 2025-05-20 DIAGNOSIS — R51.9 ACUTE NONINTRACTABLE HEADACHE, UNSPECIFIED HEADACHE TYPE: ICD-10-CM

## 2025-05-20 DIAGNOSIS — R10.84 GENERALIZED ABDOMINAL PAIN: ICD-10-CM

## 2025-05-20 DIAGNOSIS — R20.2 TINGLING OF LEFT UPPER EXTREMITY: ICD-10-CM

## 2025-05-20 DIAGNOSIS — R68.84 JAW PAIN: ICD-10-CM

## 2025-05-20 DIAGNOSIS — R20.0 NUMBNESS AND TINGLING OF LEG: ICD-10-CM

## 2025-05-20 DIAGNOSIS — R51.9 ACUTE NONINTRACTABLE HEADACHE, UNSPECIFIED HEADACHE TYPE: Primary | ICD-10-CM

## 2025-05-20 LAB
ALBUMIN SERPL BCP-MCNC: 4.4 G/DL (ref 3.2–4.9)
ALBUMIN/GLOB SERPL: 1.4 G/DL
ALP SERPL-CCNC: 77 U/L (ref 30–99)
ALT SERPL-CCNC: 19 U/L (ref 2–50)
ANION GAP SERPL CALC-SCNC: 11 MMOL/L (ref 7–16)
AST SERPL-CCNC: 22 U/L (ref 12–45)
BILIRUB SERPL-MCNC: 0.4 MG/DL (ref 0.1–1.5)
BUN SERPL-MCNC: 13 MG/DL (ref 8–22)
CALCIUM ALBUM COR SERPL-MCNC: 9.4 MG/DL (ref 8.5–10.5)
CALCIUM SERPL-MCNC: 9.7 MG/DL (ref 8.5–10.5)
CHLORIDE SERPL-SCNC: 103 MMOL/L (ref 96–112)
CO2 SERPL-SCNC: 24 MMOL/L (ref 20–33)
CREAT SERPL-MCNC: 0.87 MG/DL (ref 0.5–1.4)
CRP SERPL HS-MCNC: <0.3 MG/DL (ref 0–0.75)
D DIMER PPP IA.FEU-MCNC: 0.64 UG/ML (FEU) (ref 0–0.5)
ERYTHROCYTE [DISTWIDTH] IN BLOOD BY AUTOMATED COUNT: 48.9 FL (ref 35.9–50)
ERYTHROCYTE [SEDIMENTATION RATE] IN BLOOD BY WESTERGREN METHOD: 5 MM/HOUR (ref 0–25)
GFR SERPLBLD CREATININE-BSD FMLA CKD-EPI: 81 ML/MIN/1.73 M 2
GLOBULIN SER CALC-MCNC: 3.2 G/DL (ref 1.9–3.5)
GLUCOSE SERPL-MCNC: 96 MG/DL (ref 65–99)
HCT VFR BLD AUTO: 39.4 % (ref 37–47)
HGB BLD-MCNC: 13 G/DL (ref 12–16)
MCH RBC QN AUTO: 28.1 PG (ref 27–33)
MCHC RBC AUTO-ENTMCNC: 33 G/DL (ref 32.2–35.5)
MCV RBC AUTO: 85.3 FL (ref 81.4–97.8)
PLATELET # BLD AUTO: 201 K/UL (ref 164–446)
PMV BLD AUTO: 10.9 FL (ref 9–12.9)
POTASSIUM SERPL-SCNC: 3.9 MMOL/L (ref 3.6–5.5)
PROT SERPL-MCNC: 7.6 G/DL (ref 6–8.2)
RBC # BLD AUTO: 4.62 M/UL (ref 4.2–5.4)
SODIUM SERPL-SCNC: 138 MMOL/L (ref 135–145)
TROPONIN T SERPL-MCNC: <6 NG/L (ref 6–19)
WBC # BLD AUTO: 6.4 K/UL (ref 4.8–10.8)

## 2025-05-20 PROCEDURE — 86140 C-REACTIVE PROTEIN: CPT

## 2025-05-20 PROCEDURE — 3078F DIAST BP <80 MM HG: CPT | Performed by: FAMILY MEDICINE

## 2025-05-20 PROCEDURE — 36415 COLL VENOUS BLD VENIPUNCTURE: CPT

## 2025-05-20 PROCEDURE — 85027 COMPLETE CBC AUTOMATED: CPT

## 2025-05-20 PROCEDURE — 93000 ELECTROCARDIOGRAM COMPLETE: CPT | Performed by: FAMILY MEDICINE

## 2025-05-20 PROCEDURE — 80053 COMPREHEN METABOLIC PANEL: CPT

## 2025-05-20 PROCEDURE — 85652 RBC SED RATE AUTOMATED: CPT

## 2025-05-20 PROCEDURE — 71046 X-RAY EXAM CHEST 2 VIEWS: CPT

## 2025-05-20 PROCEDURE — 99214 OFFICE O/P EST MOD 30 MIN: CPT | Performed by: FAMILY MEDICINE

## 2025-05-20 PROCEDURE — 84484 ASSAY OF TROPONIN QUANT: CPT

## 2025-05-20 PROCEDURE — 85379 FIBRIN DEGRADATION QUANT: CPT

## 2025-05-20 PROCEDURE — 3074F SYST BP LT 130 MM HG: CPT | Performed by: FAMILY MEDICINE

## 2025-05-20 ASSESSMENT — FIBROSIS 4 INDEX: FIB4 SCORE: 1.77

## 2025-05-20 ASSESSMENT — PATIENT HEALTH QUESTIONNAIRE - PHQ9: CLINICAL INTERPRETATION OF PHQ2 SCORE: 0

## 2025-05-20 NOTE — PROGRESS NOTES
Verbal consent was acquired by the patient to use [employer] ambient listening note generation during this visit:  Yes      Chief complaint::The primary encounter diagnosis was Acute nonintractable headache, unspecified headache type. Diagnoses of Jaw pain, Chest pressure, Tingling of left upper extremity, Numbness and tingling of leg, and Generalized abdominal pain were also pertinent to this visit.    Assessment and Plan:   The following treatment plan was discussed:     Assessment & Plan  1. Acute headache: Persistent. Tenderness over palpation of the left side of the scalp without visual lesions or rash. Possibly related to irritated or aggravated nerve.  - Recommend ibuprofen 600-800 mg.  - Prednisone offered but declined.  - CBC, CMP, sed rate, CRP ordered for the patient.    2. Left-sided jaw pain: Associated with headache.  - Recommend ibuprofen 600-800 mg.  - EKG, CBC, CMP, GFR, D-dimer, troponin, and chest x-ray ordered for further evaluation.    3. Left chest pressure: Acute symptom.  - EKG, CBC, CMP, GFR, D-dimer, troponin, chest x-ray, and nuclear medicine stress test ordered to rule out cardiac involvement.  - EKG: Normal without ST elevation or depression    4. Left upper extremity tingling: Acute symptom.  - EKG, CBC, CMP, GFR, D-dimer, troponin, and chest x-ray ordered for further evaluation.    5. Left leg numbness: Acute symptom.  - EKG, CBC, CMP, GFR, D-dimer, troponin, and chest x-ray ordered for further evaluation.    6. Generalized abdominal pain and pressure: Acute symptoms.  - Recommend omeprazole for possible acid reflux.  - EKG, CBC, CMP, GFR, D-dimer, troponin, and chest x-ray ordered for further evaluation.    Follow-up  - Follow up with PCP for results of ordered tests.  Koki was seen today for headache.    Diagnoses and all orders for this visit:    Acute nonintractable headache, unspecified headache type  -     CBC WITHOUT DIFFERENTIAL; Future  -     Comp Metabolic Panel; Future  -      ESTIMATED GFR; Future  -     Sed Rate; Future  -     CRP QUANTITIVE (NON-CARDIAC); Future  -     NM-CARDIAC STRESS TEST; Future    Jaw pain  -     EKG - Clinic Performed  -     DX-CHEST-2 VIEWS; Future  -     TROPONIN; Future  -     D-DIMER; Future  -     CBC WITHOUT DIFFERENTIAL; Future  -     Comp Metabolic Panel; Future  -     ESTIMATED GFR; Future  -     Sed Rate; Future  -     CRP QUANTITIVE (NON-CARDIAC); Future  -     NM-CARDIAC STRESS TEST; Future    Chest pressure  -     EKG - Clinic Performed  -     DX-CHEST-2 VIEWS; Future  -     TROPONIN; Future  -     D-DIMER; Future  -     CBC WITHOUT DIFFERENTIAL; Future  -     Comp Metabolic Panel; Future  -     ESTIMATED GFR; Future  -     Sed Rate; Future  -     CRP QUANTITIVE (NON-CARDIAC); Future  -     NM-CARDIAC STRESS TEST; Future    Tingling of left upper extremity  -     EKG - Clinic Performed  -     DX-CHEST-2 VIEWS; Future  -     TROPONIN; Future  -     D-DIMER; Future  -     CBC WITHOUT DIFFERENTIAL; Future  -     Comp Metabolic Panel; Future  -     ESTIMATED GFR; Future  -     Sed Rate; Future  -     CRP QUANTITIVE (NON-CARDIAC); Future  -     NM-CARDIAC STRESS TEST; Future    Numbness and tingling of leg  -     EKG - Clinic Performed  -     DX-CHEST-2 VIEWS; Future  -     TROPONIN; Future  -     D-DIMER; Future  -     CBC WITHOUT DIFFERENTIAL; Future  -     Comp Metabolic Panel; Future  -     ESTIMATED GFR; Future  -     Sed Rate; Future  -     CRP QUANTITIVE (NON-CARDIAC); Future  -     NM-CARDIAC STRESS TEST; Future    Generalized abdominal pain  -     EKG - Clinic Performed  -     DX-CHEST-2 VIEWS; Future  -     TROPONIN; Future  -     D-DIMER; Future  -     CBC WITHOUT DIFFERENTIAL; Future  -     Comp Metabolic Panel; Future  -     ESTIMATED GFR; Future  -     Sed Rate; Future  -     CRP QUANTITIVE (NON-CARDIAC); Future  -     NM-CARDIAC STRESS TEST; Future        Followup: Return if symptoms worsen or fail to improve.    I have placed EKG  orders.  The MA is preforming EKG orders under the direction of Dr. Bassett    Subjective/HPI:     HPI:    Koki Arguello is a pleasant 49 y.o. female here for   Chief Complaint   Patient presents with    Headache     Left side of head, pain radiates down to ear and mouth, numbness on right arm, shortness of breath, X 3 days        History of Present Illness  The patient is a 49-year-old individual with headache, neck pain, and right arm numbness.    Headache  - Mild left ear pain for the past week, escalating to a sharp headache on Saturday evening  - Severe and irritating headache radiating from ear to mouth  - Temporary relief following a bath  - Dizziness upon standing  - Persistent, intense headache  - No facial drooping, weakness, or difficulty swallowing  - Muscle relaxers and clove oil provided some relief  - Headache persisted on Sunday, accompanied by pain around ears and teeth, arm pain, heart discomfort, and dizziness  - Aspirin did not significantly alleviate the headache  - Intermittent headache on Monday, lasting for 2 minutes before subsiding  - Known small brain cyst and MRI scheduled with a neurosurgeon  - No rash or burning pain  - Occasional head scratching  - History of Bell's palsy 15 years ago and bruxism    Neck Pain and Numbness  - Neck stiffness  - Arm numbness  - Transient leg numbness and tingling, gradually resolving    Abdominal Pain and Chest Pressure  - Mild abdominal pain  - Chest pressure  - New symptoms for the patient  - No history of anxiety or depression  - Acknowledges recent stress and poor sleep  - Plans to monitor these symptoms for 3 days before seeking medical attention    Hot Flashes  - Experiencing hot flashes  - Alternating between feeling hot and cold    Supplemental information: No facial drooping, weakness, or difficulty swallowing. No rash or burning pain. Occasional head scratching. History of Bell's palsy 15 years ago and bruxism.    FAMILY  "HISTORY  Their mother experienced hot flashes in her 50s.    Current Medicines (including changes today)  Current Medications[1]  Past Medical/ Surgical History  She  has a past medical history of Allergy, Family history of breast cancer in mother, GERD (gastroesophageal reflux disease), Hypothyroidism, Proteinuria, Scoliosis, Temporomandibular joint-pain-dysfunction syndrome (TMJ), Tinnitus, and Trigeminal neuralgia of left side of face.  She  has a past surgical history that includes ganglion excision (9/22/08) and ganglion excision.       Objective:   /62   Pulse 74   Temp 37 °C (98.6 °F) (Temporal)   Ht 1.6 m (5' 3\")   Wt 63.5 kg (140 lb)   SpO2 97%  Body mass index is 24.8 kg/m².    Physical exam was made by observation   Physical Exam  Constitutional:       General: She is not in acute distress.     Appearance: She is not ill-appearing or toxic-appearing.   HENT:      Head: Normocephalic.      Right Ear: Tympanic membrane and external ear normal.      Left Ear: Tympanic membrane and external ear normal.      Nose: Nose normal. No rhinorrhea.      Mouth/Throat:      Mouth: Mucous membranes are moist.      Pharynx: Oropharynx is clear. No posterior oropharyngeal erythema.   Eyes:      General: Lids are normal.         Right eye: No discharge.         Left eye: No discharge.      Extraocular Movements: Extraocular movements intact.      Conjunctiva/sclera: Conjunctivae normal.      Pupils: Pupils are equal, round, and reactive to light.   Cardiovascular:      Rate and Rhythm: Normal rate and regular rhythm.      Heart sounds: No murmur heard.  Pulmonary:      Effort: Pulmonary effort is normal. No respiratory distress.      Breath sounds: Normal breath sounds. No wheezing.      Comments: Mild increase in chest tenderness with deep inspiration, no reproducible chest pain with palpation  Abdominal:      General: Abdomen is flat.   Musculoskeletal:         General: No swelling or tenderness.      Cervical " back: Normal range of motion and neck supple. No torticollis or crepitus. No pain with movement.      Right lower leg: No edema.      Left lower leg: No edema.   Skin:     General: Skin is warm.   Neurological:      General: No focal deficit present.      Mental Status: She is alert and oriented to person, place, and time. Mental status is at baseline.   Psychiatric:         Mood and Affect: Mood normal.          Results  - EKG (20 May 2025):    - Normal sinus rhythm, no ST depression or elevation identified    - Heart rate: 59    Please note that this dictation was created using voice recognition software. I have made every reasonable attempt to correct obvious errors, but I expect that there are errors of grammar and possibly content that I did not discover before finalizing the note.           [1]   Current Outpatient Medications   Medication Sig Dispense Refill    Mvnwtdrrd-Kmhptlsoi-Ilpjcettq 500 & 500 & 30 MG Therapy Pack Take 1 Each by mouth 2 times a day. Prevpac as directed bid 1 Each 0    omeprazole (PRILOSEC) 20 MG delayed-release capsule Take 1 Capsule by mouth every day. 30 Capsule 1    sucralfate (CARAFATE) 1 GM Tab Take 1 Tablet by mouth 4 Times a Day,Before Meals and at Bedtime. 120 Tablet 0    Pseudoephedrine HCl (BL 12 HOUR COLD PO) Take  by mouth.      levothyroxine (SYNTHROID) 50 MCG Tab TAKE TWO TABLETS BY MOUTH ONCE WEEKLY AND TAKE 1 TABLET BY MOUTH DAILY ON ALL OTHER DAYS 100 Tablet 3    Multiple Vitamins-Minerals (MULTIVITAL) TABS Take 1 Tab by mouth every day.      Cholecalciferol (VITAMIN D) 1000 UNIT CAPS Take 2 Caps by mouth every day.       No current facility-administered medications for this visit.

## 2025-05-22 ENCOUNTER — HOSPITAL ENCOUNTER (EMERGENCY)
Facility: MEDICAL CENTER | Age: 50
End: 2025-05-22
Attending: STUDENT IN AN ORGANIZED HEALTH CARE EDUCATION/TRAINING PROGRAM
Payer: COMMERCIAL

## 2025-05-22 ENCOUNTER — APPOINTMENT (OUTPATIENT)
Dept: RADIOLOGY | Facility: MEDICAL CENTER | Age: 50
End: 2025-05-22
Attending: STUDENT IN AN ORGANIZED HEALTH CARE EDUCATION/TRAINING PROGRAM
Payer: COMMERCIAL

## 2025-05-22 ENCOUNTER — OFFICE VISIT (OUTPATIENT)
Dept: MEDICAL GROUP | Facility: MEDICAL CENTER | Age: 50
End: 2025-05-22
Payer: COMMERCIAL

## 2025-05-22 VITALS
OXYGEN SATURATION: 96 % | HEIGHT: 62 IN | WEIGHT: 139.55 LBS | RESPIRATION RATE: 16 BRPM | HEART RATE: 64 BPM | TEMPERATURE: 97.3 F | DIASTOLIC BLOOD PRESSURE: 65 MMHG | SYSTOLIC BLOOD PRESSURE: 108 MMHG | BODY MASS INDEX: 25.68 KG/M2

## 2025-05-22 VITALS
DIASTOLIC BLOOD PRESSURE: 62 MMHG | HEART RATE: 67 BPM | BODY MASS INDEX: 25.56 KG/M2 | TEMPERATURE: 97 F | OXYGEN SATURATION: 96 % | HEIGHT: 62 IN | WEIGHT: 138.89 LBS | SYSTOLIC BLOOD PRESSURE: 104 MMHG

## 2025-05-22 DIAGNOSIS — E55.9 VITAMIN D DEFICIENCY: ICD-10-CM

## 2025-05-22 DIAGNOSIS — I67.1 LEFT CAVERNOUS CAROTID ANEURYSM: ICD-10-CM

## 2025-05-22 DIAGNOSIS — Z13.89 SCREENING FOR MULTIPLE CONDITIONS: ICD-10-CM

## 2025-05-22 DIAGNOSIS — G44.1 OTHER VASCULAR HEADACHE: Primary | ICD-10-CM

## 2025-05-22 DIAGNOSIS — I67.1 INTERNAL CAROTID ANEURYSM: ICD-10-CM

## 2025-05-22 DIAGNOSIS — Z13.1 SCREENING FOR DIABETES MELLITUS: ICD-10-CM

## 2025-05-22 DIAGNOSIS — Z13.220 SCREENING FOR HYPERLIPIDEMIA: ICD-10-CM

## 2025-05-22 DIAGNOSIS — E03.9 ACQUIRED HYPOTHYROIDISM: Primary | ICD-10-CM

## 2025-05-22 DIAGNOSIS — R06.02 SHORTNESS OF BREATH: ICD-10-CM

## 2025-05-22 DIAGNOSIS — R51.9 ACUTE NONINTRACTABLE HEADACHE, UNSPECIFIED HEADACHE TYPE: Primary | ICD-10-CM

## 2025-05-22 LAB
ALBUMIN SERPL BCP-MCNC: 4.2 G/DL (ref 3.2–4.9)
ALBUMIN/GLOB SERPL: 1.4 G/DL
ALP SERPL-CCNC: 79 U/L (ref 30–99)
ALT SERPL-CCNC: 17 U/L (ref 2–50)
ANION GAP SERPL CALC-SCNC: 12 MMOL/L (ref 7–16)
AST SERPL-CCNC: 22 U/L (ref 12–45)
BASOPHILS # BLD AUTO: 0.4 % (ref 0–1.8)
BASOPHILS # BLD: 0.02 K/UL (ref 0–0.12)
BILIRUB SERPL-MCNC: 0.4 MG/DL (ref 0.1–1.5)
BUN SERPL-MCNC: 13 MG/DL (ref 8–22)
CALCIUM ALBUM COR SERPL-MCNC: 9.2 MG/DL (ref 8.5–10.5)
CALCIUM SERPL-MCNC: 9.4 MG/DL (ref 8.5–10.5)
CHLORIDE SERPL-SCNC: 106 MMOL/L (ref 96–112)
CO2 SERPL-SCNC: 22 MMOL/L (ref 20–33)
CREAT SERPL-MCNC: 0.8 MG/DL (ref 0.5–1.4)
EOSINOPHIL # BLD AUTO: 0.3 K/UL (ref 0–0.51)
EOSINOPHIL NFR BLD: 6 % (ref 0–6.9)
ERYTHROCYTE [DISTWIDTH] IN BLOOD BY AUTOMATED COUNT: 47.4 FL (ref 35.9–50)
GFR SERPLBLD CREATININE-BSD FMLA CKD-EPI: 89 ML/MIN/1.73 M 2
GLOBULIN SER CALC-MCNC: 3.1 G/DL (ref 1.9–3.5)
GLUCOSE SERPL-MCNC: 93 MG/DL (ref 65–99)
HCG SERPL QL: NEGATIVE
HCT VFR BLD AUTO: 38.3 % (ref 37–47)
HGB BLD-MCNC: 12.8 G/DL (ref 12–16)
IMM GRANULOCYTES # BLD AUTO: 0.03 K/UL (ref 0–0.11)
IMM GRANULOCYTES NFR BLD AUTO: 0.6 % (ref 0–0.9)
LYMPHOCYTES # BLD AUTO: 1.33 K/UL (ref 1–4.8)
LYMPHOCYTES NFR BLD: 26.7 % (ref 22–41)
MCH RBC QN AUTO: 28.1 PG (ref 27–33)
MCHC RBC AUTO-ENTMCNC: 33.4 G/DL (ref 32.2–35.5)
MCV RBC AUTO: 84 FL (ref 81.4–97.8)
MONOCYTES # BLD AUTO: 0.41 K/UL (ref 0–0.85)
MONOCYTES NFR BLD AUTO: 8.2 % (ref 0–13.4)
NEUTROPHILS # BLD AUTO: 2.9 K/UL (ref 1.82–7.42)
NEUTROPHILS NFR BLD: 58.1 % (ref 44–72)
NRBC # BLD AUTO: 0 K/UL
NRBC BLD-RTO: 0 /100 WBC (ref 0–0.2)
PLATELET # BLD AUTO: 194 K/UL (ref 164–446)
PMV BLD AUTO: 10.9 FL (ref 9–12.9)
POTASSIUM SERPL-SCNC: 3.8 MMOL/L (ref 3.6–5.5)
PROT SERPL-MCNC: 7.3 G/DL (ref 6–8.2)
RBC # BLD AUTO: 4.56 M/UL (ref 4.2–5.4)
SODIUM SERPL-SCNC: 140 MMOL/L (ref 135–145)
WBC # BLD AUTO: 5 K/UL (ref 4.8–10.8)

## 2025-05-22 PROCEDURE — 80053 COMPREHEN METABOLIC PANEL: CPT

## 2025-05-22 PROCEDURE — 85025 COMPLETE CBC W/AUTO DIFF WBC: CPT

## 2025-05-22 PROCEDURE — 84703 CHORIONIC GONADOTROPIN ASSAY: CPT

## 2025-05-22 PROCEDURE — 700111 HCHG RX REV CODE 636 W/ 250 OVERRIDE (IP): Mod: JZ | Performed by: STUDENT IN AN ORGANIZED HEALTH CARE EDUCATION/TRAINING PROGRAM

## 2025-05-22 PROCEDURE — 96365 THER/PROPH/DIAG IV INF INIT: CPT

## 2025-05-22 PROCEDURE — 700117 HCHG RX CONTRAST REV CODE 255: Performed by: STUDENT IN AN ORGANIZED HEALTH CARE EDUCATION/TRAINING PROGRAM

## 2025-05-22 PROCEDURE — 70496 CT ANGIOGRAPHY HEAD: CPT

## 2025-05-22 PROCEDURE — 3078F DIAST BP <80 MM HG: CPT | Performed by: NURSE PRACTITIONER

## 2025-05-22 PROCEDURE — 71275 CT ANGIOGRAPHY CHEST: CPT

## 2025-05-22 PROCEDURE — 3074F SYST BP LT 130 MM HG: CPT | Performed by: NURSE PRACTITIONER

## 2025-05-22 PROCEDURE — 36415 COLL VENOUS BLD VENIPUNCTURE: CPT

## 2025-05-22 PROCEDURE — 99214 OFFICE O/P EST MOD 30 MIN: CPT | Performed by: NURSE PRACTITIONER

## 2025-05-22 PROCEDURE — 99284 EMERGENCY DEPT VISIT MOD MDM: CPT

## 2025-05-22 PROCEDURE — 700105 HCHG RX REV CODE 258: Performed by: STUDENT IN AN ORGANIZED HEALTH CARE EDUCATION/TRAINING PROGRAM

## 2025-05-22 PROCEDURE — 96375 TX/PRO/DX INJ NEW DRUG ADDON: CPT

## 2025-05-22 PROCEDURE — 70498 CT ANGIOGRAPHY NECK: CPT

## 2025-05-22 RX ORDER — METOCLOPRAMIDE HYDROCHLORIDE 5 MG/ML
10 INJECTION INTRAMUSCULAR; INTRAVENOUS ONCE
Status: COMPLETED | OUTPATIENT
Start: 2025-05-22 | End: 2025-05-22

## 2025-05-22 RX ORDER — KETOROLAC TROMETHAMINE 15 MG/ML
15 INJECTION, SOLUTION INTRAMUSCULAR; INTRAVENOUS ONCE
Status: COMPLETED | OUTPATIENT
Start: 2025-05-22 | End: 2025-05-22

## 2025-05-22 RX ORDER — DIPHENHYDRAMINE HYDROCHLORIDE 50 MG/ML
25 INJECTION, SOLUTION INTRAMUSCULAR; INTRAVENOUS ONCE
Status: COMPLETED | OUTPATIENT
Start: 2025-05-22 | End: 2025-05-22

## 2025-05-22 RX ORDER — SODIUM CHLORIDE, SODIUM LACTATE, POTASSIUM CHLORIDE, CALCIUM CHLORIDE 600; 310; 30; 20 MG/100ML; MG/100ML; MG/100ML; MG/100ML
1000 INJECTION, SOLUTION INTRAVENOUS ONCE
Status: COMPLETED | OUTPATIENT
Start: 2025-05-22 | End: 2025-05-22

## 2025-05-22 RX ORDER — ACETAMINOPHEN 10 MG/ML
1000 INJECTION, SOLUTION INTRAVENOUS ONCE
Status: COMPLETED | OUTPATIENT
Start: 2025-05-22 | End: 2025-05-22

## 2025-05-22 RX ADMIN — METOCLOPRAMIDE 10 MG: 5 INJECTION, SOLUTION INTRAMUSCULAR; INTRAVENOUS at 02:05

## 2025-05-22 RX ADMIN — KETOROLAC TROMETHAMINE 15 MG: 15 INJECTION, SOLUTION INTRAMUSCULAR; INTRAVENOUS at 05:15

## 2025-05-22 RX ADMIN — ACETAMINOPHEN 1000 MG: 1000 INJECTION INTRAVENOUS at 02:55

## 2025-05-22 RX ADMIN — IOHEXOL 52 ML: 350 INJECTION, SOLUTION INTRAVENOUS at 04:03

## 2025-05-22 RX ADMIN — DIPHENHYDRAMINE HYDROCHLORIDE 25 MG: 50 INJECTION, SOLUTION INTRAMUSCULAR; INTRAVENOUS at 02:05

## 2025-05-22 RX ADMIN — IOHEXOL 95 ML: 350 INJECTION, SOLUTION INTRAVENOUS at 04:06

## 2025-05-22 RX ADMIN — SODIUM CHLORIDE, POTASSIUM CHLORIDE, SODIUM LACTATE AND CALCIUM CHLORIDE 1000 ML: 600; 310; 30; 20 INJECTION, SOLUTION INTRAVENOUS at 02:10

## 2025-05-22 ASSESSMENT — FIBROSIS 4 INDEX
FIB4 SCORE: 1.38
FIB4 SCORE: 1.26

## 2025-05-22 ASSESSMENT — PAIN DESCRIPTION - PAIN TYPE
TYPE: ACUTE PAIN
TYPE: ACUTE PAIN

## 2025-05-22 NOTE — Clinical Note
Koki Arguello was seen and treated in our emergency department on 5/22/2025.  She may return to work on 05/23/2025.       If you have any questions or concerns, please don't hesitate to call.      Brigida Roach M.D.

## 2025-05-22 NOTE — ED NOTES
Pt. C/o headache that started on Saturday and has continue to increase.  Pt. [place].

## 2025-05-22 NOTE — DISCHARGE INSTRUCTIONS
You are seen for evaluation of headache.  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  I discussed with neurology recommends referral to interventional radiology for potential treatment of this.  I have referred you to IR.  You need to be seen by either Dr. Arias or Dr. Baum for this.  Please keep your primary care doctor's appointment today and discuss this further.  Return emergency room for any severe headache, vomiting, numbness, tingling, weakness, vision changes, confusion or any other concerns.

## 2025-05-22 NOTE — ED TRIAGE NOTES
"Chief Complaint   Patient presents with    Migraine     C/o left sided migraine since Saturday with hx of. Pt denies n/v. Pt seen my neuro with MRI reults showing cyst on the left side. Pt endorses dizziness with sharp pain radiating from head to left side of jaw     /78   Pulse 68   Temp 36.3 °C (97.3 °F) (Temporal)   Resp 16   Ht 1.575 m (5' 2\")   Wt 63.3 kg (139 lb 8.8 oz)   SpO2 98%    "

## 2025-05-22 NOTE — ED PROVIDER NOTES
ED Provider Note    CHIEF COMPLAINT  Chief Complaint   Patient presents with    Migraine     C/o left sided migraine since Saturday with hx of. Pt denies n/v. Pt seen my neuro with MRI reults showing cyst on the left side. Pt endorses dizziness with sharp pain radiating from head to left side of jaw        EXTERNAL RECORDS REVIEWED  Outpatient Notes patient was seen by neurology in 2018 as she has history of left facial pain since June 2017, left occipital burning since October 2017 and left limbs tingling for weeks.  She had 2 MRI of the brain done last year with and without contrast which showed cortical-based cystic nonaggressive neoplasm    HPI/ROS  LIMITATION TO HISTORY   Select: : None  OUTSIDE HISTORIAN(S):      Koki Arguello is a 49 y.o. female who presents for evaluation of left-sided headache.  Patient states that the pain is throbbing.  She states that she has history of chronic headaches however she has not had a headache in about the past year.  These headaches started coming back 2 weeks ago.  They come and go.  They should become more persistent on Saturday however she has had episodes where she does not have the headache.  She denies any jaw claudication.  She has no numbness, tingling, weakness.  She denies any nausea, vomiting, fevers, chills.  She has no history of head trauma.  She took 2 aspirin today for her pain with improvement.  She felt like the pain got worse at 1230 therefore decided come to emergency room for evaluation.  She does see neurology for her headaches and was offered medication however declined.  She is also seeing neurosurgery for some sort of cystic mass on the left temporal area but when she saw the neurosurgeon he asked her why she was referred to him as he did not think that this mass was causing any issues for her. She denies blood thinners.     She also notes that she saw her primary care doctor 2 days ago for evaluation of shortness of breath.  A  "D-dimer was ordered.  Her doctor has not called her back however she was reviewed her labs and her D-dimer is elevated.    PAST MEDICAL HISTORY   has a past medical history of Allergy, Family history of breast cancer in mother, GERD (gastroesophageal reflux disease), Hypothyroidism, Proteinuria, Scoliosis, Temporomandibular joint-pain-dysfunction syndrome (TMJ), Tinnitus, and Trigeminal neuralgia of left side of face.    SURGICAL HISTORY   has a past surgical history that includes ganglion excision (9/22/08) and ganglion excision.    FAMILY HISTORY  Family History   Problem Relation Age of Onset    Cancer Mother         breast    Heart Disease Father     Diabetes Maternal Grandmother     Hypertension Maternal Grandmother     Diabetes Paternal Grandmother        SOCIAL HISTORY  Social History     Tobacco Use    Smoking status: Never    Smokeless tobacco: Never   Vaping Use    Vaping status: Never Used   Substance and Sexual Activity    Alcohol use: Yes     Alcohol/week: 0.0 oz     Comment: occasional    Drug use: No    Sexual activity: Not on file       CURRENT MEDICATIONS  Home Medications    **Home medications have not yet been reviewed for this encounter**         ALLERGIES  Allergies[1]    PHYSICAL EXAM  VITAL SIGNS: /78   Pulse 68   Temp 36.3 °C (97.3 °F) (Temporal)   Resp 16   Ht 1.575 m (5' 2\")   Wt 63.3 kg (139 lb 8.8 oz)   SpO2 98%   BMI 25.52 kg/m²      Constitutional: Alert, mild distress  HEENT: Normocephalic, Atraumatic,  external ears normal, pharynx pink,  Mucous  Membranes moist, No rhinorrhea or mucosal edema  Eyes: PERRL, EOMI, Conjunctiva normal, No discharge.   Cardiovascular: Regular Rate and Rhythm, No murmurs,  rubs, or gallops.   Thorax & Lungs: Lungs clear to auscultation bilaterally, No respiratory distress, No wheezes, rhales or rhonchi, No chest wall tenderness.   Abdomen: Soft, non tender, non distended,  No pulsatile masses., no rebound guarding or peritoneal signs.   Skin: " Warm, Dry, No erythema, No rash,   Back:  No CVA tenderness,  No spinal tenderness, bony crepitance step offs or instability.   Extremities: Equal, intact distal pulses, No cyanosis, clubbing or edema,  No tenderness.   Musculoskeletal: Good range of motion in all major joints. No tenderness to palpation or major deformities noted.   Neurologic: Alert and oriented, Symmetric smile, eyes shut tight bilaterally, forehead wrinkles bilaterally, sensation intact to light touch bilateral face, tongue midline, head turn and shoulder shrug with full strength. Hearing intact grossly bilaterally. 5/5 strength shoulder, elbow  b/l. 5/5 strength hip, knee, ankle b/l   SILT biceps, forearms, hands, SILT thighs, shins mid foot   NO pronator drift,  no aphasia, no dysarthria, no gaze preference or visual deficit   Psychiatric: Affect normal, Judgment normal, Mood normal.    EKG/LABS  Results for orders placed or performed during the hospital encounter of 05/22/25   CBC WITH DIFFERENTIAL    Collection Time: 05/22/25  2:00 AM   Result Value Ref Range    WBC 5.0 4.8 - 10.8 K/uL    RBC 4.56 4.20 - 5.40 M/uL    Hemoglobin 12.8 12.0 - 16.0 g/dL    Hematocrit 38.3 37.0 - 47.0 %    MCV 84.0 81.4 - 97.8 fL    MCH 28.1 27.0 - 33.0 pg    MCHC 33.4 32.2 - 35.5 g/dL    RDW 47.4 35.9 - 50.0 fL    Platelet Count 194 164 - 446 K/uL    MPV 10.9 9.0 - 12.9 fL    Neutrophils-Polys 58.10 44.00 - 72.00 %    Lymphocytes 26.70 22.00 - 41.00 %    Monocytes 8.20 0.00 - 13.40 %    Eosinophils 6.00 0.00 - 6.90 %    Basophils 0.40 0.00 - 1.80 %    Immature Granulocytes 0.60 0.00 - 0.90 %    Nucleated RBC 0.00 0.00 - 0.20 /100 WBC    Neutrophils (Absolute) 2.90 1.82 - 7.42 K/uL    Lymphs (Absolute) 1.33 1.00 - 4.80 K/uL    Monos (Absolute) 0.41 0.00 - 0.85 K/uL    Eos (Absolute) 0.30 0.00 - 0.51 K/uL    Baso (Absolute) 0.02 0.00 - 0.12 K/uL    Immature Granulocytes (abs) 0.03 0.00 - 0.11 K/uL    NRBC (Absolute) 0.00 K/uL   COMP METABOLIC PANEL     Collection Time: 05/22/25  2:00 AM   Result Value Ref Range    Sodium 140 135 - 145 mmol/L    Potassium 3.8 3.6 - 5.5 mmol/L    Chloride 106 96 - 112 mmol/L    Co2 22 20 - 33 mmol/L    Anion Gap 12.0 7.0 - 16.0    Glucose 93 65 - 99 mg/dL    Bun 13 8 - 22 mg/dL    Creatinine 0.80 0.50 - 1.40 mg/dL    Calcium 9.4 8.5 - 10.5 mg/dL    Correct Calcium 9.2 8.5 - 10.5 mg/dL    AST(SGOT) 22 12 - 45 U/L    ALT(SGPT) 17 2 - 50 U/L    Alkaline Phosphatase 79 30 - 99 U/L    Total Bilirubin 0.4 0.1 - 1.5 mg/dL    Albumin 4.2 3.2 - 4.9 g/dL    Total Protein 7.3 6.0 - 8.2 g/dL    Globulin 3.1 1.9 - 3.5 g/dL    A-G Ratio 1.4 g/dL   HCG QUAL SERUM    Collection Time: 05/22/25  2:00 AM   Result Value Ref Range    Beta-Hcg Qualitative Serum Negative Negative   ESTIMATED GFR    Collection Time: 05/22/25  2:00 AM   Result Value Ref Range    GFR (CKD-EPI) 89 >60 mL/min/1.73 m 2       I have independently interpreted this EKG    RADIOLOGY/PROCEDURES   I have independently interpreted the diagnostic imaging associated with this visit and am waiting the final reading from the radiologist.   My preliminary interpretation is as follows: no ICH    Radiologist interpretation:  CT-CTA HEAD WITH & W/O-POST PROCESS   Final Result         1.  No large vessel occlusion identified   2.  3.0 mm left internal carotid terminal segment aneurysm, referral to radiology aneurysm clinic for further evaluation and management.      CT-CTA NECK WITH & W/O-POST PROCESSING   Final Result         1.  CT angiogram of the neck within normal limits.         CT-CTA CHEST PULMONARY ARTERY W/ RECONS   Final Result         1.  No pulmonary embolus appreciated.          COURSE & MEDICAL DECISION MAKING    ASSESSMENT, COURSE AND PLAN  Care Narrative:   This is a 49-year-old female with history as above who is presenting for evaluation of left-sided headache.  She has longstanding history of headaches and follows with neurology since 2017 for these headaches.  They are  episodic and she is suffering from them about a year ago.  She takes no medications for the headache.  She has no neurologic deficits.  She has no fever, no meningitis.  No neck stiffness.  No history of head trauma.  She has no temporal tenderness to palpation and she recently had a CRP and sed rate ordered by her doctor 2 days ago and these are not elevated.  I doubt temporal arteritis. CTA head and neck obtained and shows no ICH, brain tumor but does show a 3 mm left ICA terminal segment aneurysm.  I discussed with stroke neurology who recommends referral to IR to the clinic of Dr. Blount or dr. Baum in their outpatient clinic. This referral was placed. She was give Reglan, Benadryl, IV Tylenol with complete resolution of her headache.  Results were discussed including neurology recommendations.  Patient and her  voiced understanding.    In terms of her shortness of breath, she was seen by her primary care doctor for this 2 days ago.  She had a chest x-ray which was negative.  Labs were done and they were all normal aside from an elevated D-dimer.  She has a follow-up with her primary care doctor later today. Given her shortness of breath and elevated d-dimer, will obtain CTA chest here. This shows no PE.     Results were discussed with patient.  She has a follow-up appointment with her doctor at 2 PM when she is advised to keep.  She is advised to return for severe sudden headache, vomiting, numbness, tingling, weakness or any other concerns.  Patient is agreeable to discharge planning or further questions. She will follow up with neuro IR.     Hydration: Based on the patient's presentation of Other headache the patient was given IV fluids. IV Hydration was used because oral hydration was not adequate alone. Upon recheck following hydration, the patient was improved.          ADDITIONAL PROBLEMS MANAGED  Shortness of breath - CTA chest done given elevated d-dimer in clinic and this was negative for  PE    DISPOSITION AND DISCUSSIONS  I have discussed management of the patient with the following physicians and PATRIC's:    Neurology - Dr. Limon    Discussion of management with other [facility] [place] or appropriate source(s): None     Escalation of care considered, and ultimately not performed:acute inpatient care management, however at this time, the patient is most appropriate for outpatient management    Barriers to care at this time, including but not limited to: None.     Decision tools and prescription drugs considered including, but not limited to: None.     The patient will return for new or worsening symptoms and is stable at the time of discharge.    The patient is referred to a primary physician for blood pressure management, diabetic screening, and for all other preventative health concerns.      DISPOSITION:  Patient will be discharged home in stable condition.    FOLLOW UP:  Steven Arias M.D.  [address] [place] [id number]  [phone]          Itzel Baum M.D.  [address] [place] [id number]  [phone]          [facility] - Interventional - [facility] [address] [address]  [phone]        [facility], Emergency Dept  [address]  [facility] [id number] [phone]          OUTPATIENT MEDICATIONS:  Discharge Medication List as of 5/22/2025  5:21 AM            FINAL DIAGNOSIS  1. Acute nonintractable headache, unspecified headache type Acute   2. Internal carotid aneurysm Acute   3. Shortness of breath Acute        Electronically signed by: Brigida Roach M.D., 5/22/2025 1:28 AM           [1]   Allergies  Allergen Reactions    Amoxicillin Itching and Unspecified     Tight bumpy lips    Milk [Dairy Food Allergy]

## 2025-06-01 NOTE — PROGRESS NOTES
Subjective:     History of Present Illness  The patient is a 49-year-old female who presents for follow-up for headaches.    She was seen in ER on 25 for severe persistent intractable headache.  She was found to have She has been experiencing persistent headaches, which she describes as a shooting pain originating from the back of her head and radiating down the left side of her face. The onset of these symptoms was approximately one week prior to 2025. She also reports a sensation of swelling in her left ear, although she has not had any fever. The pain extends from her upper cheek to her jaw. She recalls a similar episode occurring a year ago. This past Saturday, she experienced severe pain that radiated from the top of her head downwards. Concurrently, she reported numbness in her left arm and leg, along with stiffness in her neck. She also experienced shortness of breath, which she attributes to her headache. She has been managing her symptoms with muscle relaxers, aspirin, and icy hot. However, these medications have resulted in gastrointestinal discomfort. Despite resting all day on , she continued to experience intermittent sharp headaches, which were somewhat alleviated by ibuprofen and muscle relaxers.    She has been advised to avoid heavy lifting and stress due to her condition. She has an appointment scheduled with interventional radiology, but they have not yet received her orders.     She is considering scheduling a colonoscopy and a treadmill stress test.     She is planning to travel to [place] this coming Saturday for her niece's graduation.    Results  - Imagin25    - Neck CTA: Normal    - Head CTA:       No large vessel occlusion identified       3.0 mm left internal carotid terminal segment aneurysm, referral to radiology aneurysm clinic for further evaluation and management.    Current medicines (including changes today)  Current Medications[1]  Current  "Medications[2]    She  has a past medical history of Allergy, Family history of breast cancer in mother, GERD (gastroesophageal reflux disease), Hypothyroidism, Proteinuria, Scoliosis, Temporomandibular joint-pain-dysfunction syndrome (TMJ), Tinnitus, and Trigeminal neuralgia of left side of face.      ROS   Review of Systems   Constitutional: Negative.  Negative for fever, chills, weight loss and diaphoresis.   HENT: Negative.  Negative for hearing loss, ear pain, nosebleeds, congestion, sore throat, neck pain, tinnitus and ear discharge.    Respiratory: Negative.  Negative for cough, hemoptysis, sputum production, shortness of breath, wheezing and stridor.    Cardiovascular: Negative.  Negative for chest pain, palpitations, orthopnea, claudication, leg swelling and PND.   Gastrointestinal: denies nausea, vomiting, diarrhea, constipation, heartburn, melena or hematochezia.  Genitourinary: Denies dysuria, hematuria, urinary incontinence, frequency or urgency.    Musculoskeletal: Negative.  Negative for myalgias and back pain.   Neurological: Negative.  Negative for dizziness, tingling, tremors, weakness   Psych:  Denies depression, anxiety or insomnia.  All other systems reviewed and are negative.       Objective:     /62   Pulse 67   Temp 36.1 °C (97 °F) (Temporal)   Ht 1.575 m (5' 2\")   Wt 63 kg (138 lb 14.2 oz)   SpO2 96%  Body mass index is 25.4 kg/m².   Physical Exam  Respiratory: Clear to auscultation, no wheezing, rales or rhonchi  Cardiovascular: Regular rate and rhythm, no murmurs, rubs, or gallops  Physical Exam   Vitals reviewed.  Constitutional: oriented to person, place, and time. appears well-developed and well-nourished. No distress.   Neck: No JVD present.  Cardiovascular: Normal rate, regular rhythm, normal heart sounds and intact distal pulses.  Exam reveals no gallop and no friction rub.  No murmur heard.  No carotid bruits.   Pulmonary/Chest: Effort normal and breath sounds normal. No " stridor. No respiratory distress. no wheezes or rales. exhibits no tenderness.   Musculoskeletal: Normal range of motion. exhibits no edema. emelina pedal pulses 2+.  Lymphadenopathy: no cervical or supraclavicular adenopathy.   Neurological: alert and oriented to person, place, and time. exhibits normal muscle tone. Coordination normal.   Skin: Skin is warm and dry. no diaphoresis.   Psychiatric: normal mood and affect. behavior is normal.       Assessment and Plan:   The following treatment plan was discussed    Assessment & Plan  1. Aneurysm.  - The patient has a 3 mm left internal carotid terminal segment aneurysm.  - She has been experiencing headaches, which may be related to the aneurysm.  - A referral to interventional radiology has been made for further evaluation and potential coiling of the aneurysm.  - She has been advised to avoid heavy lifting and activities that could elevate her blood pressure.    2. Headache.  - The patient reports headaches that started around 05/17/2025, with pain radiating from the upper cheek to the jaw and down the left side.  - The headaches have been manageable with ibuprofen and muscle relaxers.  - She has been instructed to continue taking ibuprofen for headache management and to start omeprazole once daily for stomach pain relief.  - If the stomach pain persists, she should notify the provider.    3. Numbness in left arm and leg.  - The patient reports numbness in her left arm and leg associated with her headaches.  - She has been using muscle relaxers for relief.  - Further evaluation will be conducted by interventional radiology to determine if the numbness is related to the aneurysm.  - No additional treatment has been prescribed at this time.    4. Health Maintenance.  - Lab work has been ordered to be completed in about a month.  - The patient has been advised to postpone her colonoscopy until after the aneurysm is treated.  - She has been instructed to follow up with  interventional radiology for long-term monitoring.  - Annual lab work should be completed before her next annual visit.      ORDERS:  There are no diagnoses linked to this encounter.      Please note that this dictation was created using voice recognition software. I have made every reasonable attempt to correct obvious errors, but I expect that there are errors of grammar and possibly content that I did not discover before finalizing the note.      Attestation      Verbal consent was acquired by the patient to use [employer] ambient listening note generation during this visit Yes                  [1]   Current Outpatient Medications   Medication Sig Dispense Refill    Iiiagzhfc-Ddltkvjzr-Lhyeaxspa 500 & 500 & 30 MG Therapy Pack Take 1 Each by mouth 2 times a day. Prevpac as directed bid 1 Each 0    omeprazole (PRILOSEC) 20 MG delayed-release capsule Take 1 Capsule by mouth every day. 30 Capsule 1    sucralfate (CARAFATE) 1 GM Tab Take 1 Tablet by mouth 4 Times a Day,Before Meals and at Bedtime. 120 Tablet 0    Pseudoephedrine HCl (BL 12 HOUR COLD PO) Take  by mouth.      levothyroxine (SYNTHROID) 50 MCG Tab TAKE TWO TABLETS BY MOUTH ONCE WEEKLY AND TAKE 1 TABLET BY MOUTH DAILY ON ALL OTHER DAYS 100 Tablet 3    Multiple Vitamins-Minerals (MULTIVITAL) TABS Take 1 Tab by mouth every day.      Cholecalciferol (VITAMIN D) 1000 UNIT CAPS Take 2 Caps by mouth every day.       No current facility-administered medications for this visit.   [2]   Current Outpatient Medications   Medication Sig Dispense Refill    Qjiimvvhf-Bywnkzfzz-Npximfakr 500 & 500 & 30 MG Therapy Pack Take 1 Each by mouth 2 times a day. Prevpac as directed bid 1 Each 0    omeprazole (PRILOSEC) 20 MG delayed-release capsule Take 1 Capsule by mouth every day. 30 Capsule 1    sucralfate (CARAFATE) 1 GM Tab Take 1 Tablet by mouth 4 Times a Day,Before Meals and at Bedtime. 120 Tablet 0    Pseudoephedrine HCl (BL 12 HOUR COLD PO) Take  by mouth.       levothyroxine (SYNTHROID) 50 MCG Tab TAKE TWO TABLETS BY MOUTH ONCE WEEKLY AND TAKE 1 TABLET BY MOUTH DAILY ON ALL OTHER DAYS 100 Tablet 3    Multiple Vitamins-Minerals (MULTIVITAL) TABS Take 1 Tab by mouth every day.      Cholecalciferol (VITAMIN D) 1000 UNIT CAPS Take 2 Caps by mouth every day.       No current facility-administered medications for this visit.
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
## 2025-06-09 ENCOUNTER — HOSPITAL ENCOUNTER (OUTPATIENT)
Dept: RADIOLOGY | Facility: MEDICAL CENTER | Age: 50
End: 2025-06-09
Attending: STUDENT IN AN ORGANIZED HEALTH CARE EDUCATION/TRAINING PROGRAM
Payer: COMMERCIAL

## 2025-06-09 DIAGNOSIS — I67.1 INTERNAL CAROTID ANEURYSM: ICD-10-CM

## 2025-06-09 ASSESSMENT — ENCOUNTER SYMPTOMS
SENSORY CHANGE: 1
FEVER: 0
CONSTITUTIONAL NEGATIVE: 1
FOCAL WEAKNESS: 0
DIAPHORESIS: 0
WEIGHT LOSS: 0
HEMOPTYSIS: 0
CARDIOVASCULAR NEGATIVE: 1
SPEECH CHANGE: 0
HEADACHES: 1
RESPIRATORY NEGATIVE: 1
CHILLS: 0
TINGLING: 1
WEAKNESS: 0

## 2025-06-09 ASSESSMENT — LIFESTYLE VARIABLES: SUBSTANCE_ABUSE: 0

## 2025-06-10 ENCOUNTER — HOSPITAL ENCOUNTER (OUTPATIENT)
Facility: MEDICAL CENTER | Age: 50
End: 2025-06-10
Attending: NURSE PRACTITIONER
Payer: COMMERCIAL

## 2025-06-10 DIAGNOSIS — Z13.89 SCREENING FOR MULTIPLE CONDITIONS: ICD-10-CM

## 2025-06-10 DIAGNOSIS — E03.9 ACQUIRED HYPOTHYROIDISM: ICD-10-CM

## 2025-06-10 DIAGNOSIS — Z13.1 SCREENING FOR DIABETES MELLITUS: ICD-10-CM

## 2025-06-10 DIAGNOSIS — E55.9 VITAMIN D DEFICIENCY: ICD-10-CM

## 2025-06-10 LAB
25(OH)D3 SERPL-MCNC: 45 NG/ML (ref 30–100)
CREAT UR-MCNC: 45.2 MG/DL
EST. AVERAGE GLUCOSE BLD GHB EST-MCNC: 114 MG/DL
HBA1C MFR BLD: 5.6 % (ref 4–5.6)
MICROALBUMIN UR-MCNC: <1.2 MG/DL
MICROALBUMIN/CREAT UR: NORMAL MG/G (ref 0–30)
T3FREE SERPL-MCNC: 2.55 PG/ML (ref 2–4.4)
T4 FREE SERPL-MCNC: 1.22 NG/DL (ref 0.93–1.7)
TSH SERPL DL<=0.005 MIU/L-ACNC: 2.85 UIU/ML (ref 0.38–5.33)

## 2025-06-10 PROCEDURE — 82570 ASSAY OF URINE CREATININE: CPT

## 2025-06-10 PROCEDURE — 36415 COLL VENOUS BLD VENIPUNCTURE: CPT

## 2025-06-10 PROCEDURE — 84481 FREE ASSAY (FT-3): CPT

## 2025-06-10 PROCEDURE — 83036 HEMOGLOBIN GLYCOSYLATED A1C: CPT

## 2025-06-10 PROCEDURE — 84439 ASSAY OF FREE THYROXINE: CPT

## 2025-06-10 PROCEDURE — 82306 VITAMIN D 25 HYDROXY: CPT

## 2025-06-10 PROCEDURE — 84443 ASSAY THYROID STIM HORMONE: CPT

## 2025-06-10 PROCEDURE — 82043 UR ALBUMIN QUANTITATIVE: CPT

## 2025-06-14 DIAGNOSIS — E03.9 ACQUIRED HYPOTHYROIDISM: ICD-10-CM

## 2025-06-16 RX ORDER — LEVOTHYROXINE SODIUM 50 UG/1
TABLET ORAL
Qty: 30 TABLET | Refills: 0 | Status: SHIPPED | OUTPATIENT
Start: 2025-06-16 | End: 2025-06-18 | Stop reason: SDUPTHER

## 2025-06-18 ENCOUNTER — OFFICE VISIT (OUTPATIENT)
Dept: MEDICAL GROUP | Facility: MEDICAL CENTER | Age: 50
End: 2025-06-18
Payer: COMMERCIAL

## 2025-06-18 VITALS
WEIGHT: 139.88 LBS | TEMPERATURE: 98.1 F | DIASTOLIC BLOOD PRESSURE: 60 MMHG | HEIGHT: 61 IN | BODY MASS INDEX: 26.41 KG/M2 | HEART RATE: 73 BPM | OXYGEN SATURATION: 98 % | SYSTOLIC BLOOD PRESSURE: 120 MMHG

## 2025-06-18 DIAGNOSIS — Z80.3 FAMILY HISTORY OF BREAST CANCER IN MOTHER: Chronic | ICD-10-CM

## 2025-06-18 DIAGNOSIS — R80.8 OTHER PROTEINURIA: ICD-10-CM

## 2025-06-18 DIAGNOSIS — J30.1 CHRONIC ALLERGIC RHINITIS DUE TO POLLEN: ICD-10-CM

## 2025-06-18 DIAGNOSIS — K21.9 GASTROESOPHAGEAL REFLUX DISEASE WITHOUT ESOPHAGITIS: ICD-10-CM

## 2025-06-18 DIAGNOSIS — N91.2 AMENORRHEA: ICD-10-CM

## 2025-06-18 DIAGNOSIS — N95.9 POST MENOPAUSAL PROBLEMS: ICD-10-CM

## 2025-06-18 DIAGNOSIS — Z12.31 ENCOUNTER FOR SCREENING MAMMOGRAM FOR MALIGNANT NEOPLASM OF BREAST: ICD-10-CM

## 2025-06-18 DIAGNOSIS — I72.0 CAROTID ANEURYSM, LEFT (HCC): Primary | ICD-10-CM

## 2025-06-18 DIAGNOSIS — H61.91 SKIN LESION OF RIGHT EAR: ICD-10-CM

## 2025-06-18 DIAGNOSIS — E03.9 ACQUIRED HYPOTHYROIDISM: ICD-10-CM

## 2025-06-18 DIAGNOSIS — M41.35 THORACOGENIC SCOLIOSIS OF THORACOLUMBAR REGION: ICD-10-CM

## 2025-06-18 DIAGNOSIS — M26.629 TMJ PAIN DYSFUNCTION SYNDROME: ICD-10-CM

## 2025-06-18 DIAGNOSIS — G50.0 TRIGEMINAL NEURALGIA OF LEFT SIDE OF FACE: ICD-10-CM

## 2025-06-18 DIAGNOSIS — R92.30 DENSE BREAST: ICD-10-CM

## 2025-06-18 PROCEDURE — 99214 OFFICE O/P EST MOD 30 MIN: CPT | Performed by: NURSE PRACTITIONER

## 2025-06-18 PROCEDURE — 3078F DIAST BP <80 MM HG: CPT | Performed by: NURSE PRACTITIONER

## 2025-06-18 PROCEDURE — 3074F SYST BP LT 130 MM HG: CPT | Performed by: NURSE PRACTITIONER

## 2025-06-18 RX ORDER — LEVOTHYROXINE SODIUM 50 UG/1
TABLET ORAL
Qty: 90 TABLET | Refills: 3 | Status: SHIPPED | OUTPATIENT
Start: 2025-06-18

## 2025-06-18 ASSESSMENT — FIBROSIS 4 INDEX: FIB4 SCORE: 1.38

## 2025-06-18 NOTE — ASSESSMENT & PLAN NOTE
3.0 mm left internal carotid terminal segment aneurysm - seen on CTA 2025. She has seen IR and they do not feel that she needs surgery now since hard to get to, very small and low risk of rupture.

## 2025-06-18 NOTE — ASSESSMENT & PLAN NOTE
Only has pain when she overworks.  She will have pain in emelina hips for her sciatria. She controls with otc meds.

## 2025-06-18 NOTE — PROGRESS NOTES
Subjective:     History of Present Illness  The patient is a 49-year-old female who presents for a follow-up physical exam.     Her current medication regimen includes vitamin D, levothyroxine, and a multivitamin. She is seeking a refill of her levothyroxine prescription. She is stable on med.  Taking medications approp.    She has not yet completed her cholesterol panel due to the requirement of fasting but plans to do so this week. She is working to improve her healthy diet.  She is active with regular exercise.  She is also taking B12 supplements in addition to her multivitamin. She reports no issues with energy levels. She has been consuming a diet rich in greens and avoiding sugar as much as possible. She has lost 10 pounds and aims to lose more.     She has a left internal carotid aneurysm, which is currently too small to warrant intervention. She has been advised to monitor it by her interventational radiologist. She has not discussed the possibility of undergoing a colonoscopy with her interventional radiologist. She has rescheduled her Colonscopy test several times.     She has not had a period for approximately 4 months and is not using birth control. She experiences vaginal dryness during intercourse and hot flashes.    She is due for a mammogram and has heterogeneously dense breasts. She has no risk factors for hepatitis C or HIV.  She has FH of breast cancer in her mother.      She has scoliosis, which causes pain on the right side during strenuous physical activity. The pain can radiate to both hips. She has been referred to a physical therapist in the past for exercises and stretching, which have been beneficial. She experiences more pain on one side when wearing flat shoes and less pain when wearing shoes with a slight heel.  She is having a CT scan and another test for her head and ankles ordered by her pain provider    She reports no heartburn except when consuming milk. She does not take any  medication for this and avoids milk.    She has dry eyes, which she manages with artificial tears twice daily. Her vision is intact, although she uses reading glasses.  That is her only current sx of allergies.  No  recurrent sinus infections.    She has trigeminal neuralgia and is under the care of a neurologist.  She is currently stable with no sx.   She is not needing any medications for tx at this time.     She has not experienced any recent TMJ issues but avoids opening her mouth wide. She used to experience clicking and pain when her jaw would displace, but this has not occurred recently.    She reports no tinnitus.    She has noticed a new lesion on her right ear and plans to see a dermatologist.  The area was found by her  since she cannot see it.  It is brown in color.      She bruises easily and sweats excessively due to hot flashes. Her sleep has been disrupted.  She has not had a period for almost a year.  She feels that she is almost through menopause.  [facility] would like lab done to determine where she is in the monopause process.    GYNECOLOGICAL HISTORY:  - Last Menstrual Period: Approximately 02/2025    FAMILY HISTORY  - Mother had breast cancer at age 55 and underwent two mastectomies      Results  - Labs:    - TSH: 4.06 to 2.85    - T4: Normal    - T3: Normal    - A1c: Normal at 5.6.  not on med for DM.    - Albumin creatinine ratio: Normal    - Vitamin D: Normal, stable on otc supplement      Current medicines (including changes today)  Current Medications[1]  Current Medications[2]    She  has a past medical history of Allergy, Carotid aneurysm, left (HCC) (06/18/2025), Family history of breast cancer in mother, GERD (gastroesophageal reflux disease), Hypothyroidism, Proteinuria, Scoliosis, Temporomandibular joint-pain-dysfunction syndrome (TMJ), Tinnitus, and Trigeminal neuralgia of left side of face.    Hemoglobin A1c:  Lab Results   Component Value Date/Time    HBA1C 5.6 06/10/2025 05:26  PM    HBA1C 5.5 05/03/2024 07:47 AM    HBA1C 5.4 01/11/2023 11:09 AM       Microalbumin/creatinine Ratio:  Lab Results   Component Value Date/Time    URCREAT 45.20 06/10/2025 1727    MICROALBUR <1.2 06/10/2025 1727    MALBCRT see below 06/10/2025 1727     Thyroid:  Lab Results   Component Value Date/Time    TSHULTRASEN 2.850 06/10/2025 1726     Lab Results   Component Value Date/Time    FREET4 1.22 06/10/2025 1726     Vitamin D:    Lab Results   Component Value Date/Time    25HYDROXY 45 06/10/2025 1726       ROS   Review of Systems   Constitutional: Negative.  Negative for fever, chills, weight loss, malaise/fatigue and diaphoresis.   HENT: Negative.  Negative for hearing loss, ear pain, nosebleeds, congestion, sore throat, neck pain, tinnitus and ear discharge.    Eyes: Negative.  Negative for blurred vision, double vision, photophobia, pain, discharge and redness.   Respiratory: Negative.  Negative for cough, hemoptysis, sputum production, shortness of breath, wheezing and stridor.    Cardiovascular: Negative.  Negative for chest pain, palpitations, orthopnea, claudication, leg swelling and PND.   Gastrointestinal: Negative.  Negative for heartburn, nausea, vomiting, abdominal pain, diarrhea, constipation, blood in stool and melena.   Genitourinary: Negative.  Negative for dysuria, urgency, frequency, incontinence, hematuria and flank pain.   Musculoskeletal: Negative.  Negative for myalgias, joint pain and falls.   Skin: Negative.  Negative for itching and rash.   Neurological: Negative.  Negative for dizziness, tremors, sensory change, speech change, focal weakness, seizures, loss of consciousness, weakness.   Endo/Heme/Allergies: Negative.  Negative for environmental allergies and polydipsia. Does bruise/bleed easily.   Psychiatric/Behavioral: Negative.  Negative for depression, suicidal ideas, hallucinations, memory loss and substance abuse. The patient is not nervous/anxious and does have insomnia.    All  "other systems reviewed and are negative.       Objective:     /60   Pulse 73   Temp 36.7 °C (98.1 °F) (Temporal)   Ht 1.549 m (5' 1\")   Wt 63.5 kg (139 lb 14.1 oz)   SpO2 98%  Body mass index is 26.43 kg/m².   Physical Exam  Neurological: Awake, alert, oriented x4, no focal deficit  Ears: External ear canals and tympanic membranes intact, some wax present  Eyes: Pupils equal and round, conjunctivae clear  Mouth/Throat: Mucous membranes moist, no erythema, no exudate  Neck: Supple, no abnormalities  Respiratory: Clear to auscultation, no wheezing, rales or rhonchi  Cardiovascular: Regular rate and rhythm, no murmurs, rubs, or gallops  Gastrointestinal: Soft, no tenderness, no distention, no masses  Extremities: No edema, no cyanosis  Musculoskeletal: No joint or muscular abnormalities noted  Skin: No abnormalities, no rashes or lesions  Physical Exam   Vitals reviewed.  Constitutional: oriented to person, place, and time. appears well-developed and well-nourished. No distress.   HENT: Head: Normocephalic and atraumatic. Bilateral tympanic membranes wnl w/o bulging.  Right Ear: External ear normal. Left Ear: External ear normal. Nose: Nose normal.  Mouth/Throat: Oropharynx is clear and moist. No oropharyngeal exudate. emelina tm wnl. Eyes: Conjunctivae and EOM are normal. Pupils are equal, round, and reactive to light. Right eye exhibits no discharge. Left eye exhibits no discharge. No scleral icterus.    Neck: Normal range of motion. Neck supple. No JVD present.   Cardiovascular: Normal rate, regular rhythm, normal heart sounds and intact distal pulses.  Exam reveals no gallop and no friction rub.  No murmur heard.  No carotid bruits   Pulmonary/Chest: Effort normal and breath sounds normal. No stridor. No respiratory distress. no wheezes or rales. exhibits no tenderness.   Abdominal: Soft. Bowel sounds are normal. exhibits no distension and no mass. No tenderness. no rebound and no guarding. "   Musculoskeletal: Normal range of motion. exhibits no edema or tenderness.  emelina pedal pulses 2+.  Lymphadenopathy:  no cervical or supraclavicular adenopathy.   Neurological: alert and oriented to person, place, and time. has normal reflexes. displays normal reflexes. No cranial nerve deficit. exhibits normal muscle tone. Coordination normal.   Skin: Skin is warm and dry. No rash noted. no diaphoresis. No erythema. No pallor. 1 mm round macular brown skin lesion note on outer ear, no erythema or dg.  Psychiatric: normal mood and affect. behavior is normal.         Assessment and Plan:   The following treatment plan was discussed    Assessment & Plan  1. Hypothyroidism.  - Stable and well-controlled on current medication regimen.  - TSH levels have improved from 4.06 to 2.85.  - Prescription for 90 tablets of levothyroxine will be sent to pharmacy.    2. Left internal carotid aneurysm.  - Left internal carotid aneurysm in the terminal segment.  - Interventional radiologist advised it is currently too small to warrant intervention.  - Consult with interventional radiologist regarding the safety of undergoing a colonoscopy and inform us of their recommendation.    3. Perimenopausal symptoms.  - Experiencing hot flashes, vaginal dryness, and irregular periods, suggesting perimenopausal status.  - Labs for FSH, LH, and estradiol will be ordered to confirm diagnosis.  - Vaginal estrogen considered but not prescribed due to family history of breast cancer.  - Advised to use lubrication during intercourse.    4. Scoliosis.  - Pain in thoracolumbar region when engaging in strenuous physical activity.  - Advised to continue with stretching exercises and avoid activities that exacerbate pain.    5. Gastroesophageal reflux disease (GERD).  - Occasional heartburn when consuming milk.  - No medication currently taken for this condition.  - Advised to avoid triggers and consider over-the-counter antacids if symptoms  persist.    6. Chronic allergies.  - Chronic allergies, primarily to pollen, resulting in dry eyes.  - Artificial tears recommended for symptom relief.    7. Trigeminal neuralgia.  - History of trigeminal neuralgia but no recent symptoms.  - Currently under the care of neurology.    8. Temporomandibular joint (TMJ) dysfunction.  - History of TMJ dysfunction but no recent symptoms.  - Advised to avoid activities that may exacerbate condition.    9. Tinnitus.  - Tinnitus has resolved completely.    10. Right ear skin lesion.  - New lesion on right ear noted.  - Referral to dermatology for further evaluation and overall skin check.    11. Health maintenance.  - Due for colonoscopy but will opt for Cologuard pending consultation with interventional radiologist.  - Bone density test will be ordered due to perimenopausal status.  - Mammogram and whole breast ultrasound will be scheduled due to dense breast tissue and family history of breast cancer.  - Additional labs for B12 will be ordered.    Follow-up: Follow up in 1 year.      ORDERS:  1. Carotid aneurysm, left (HCC)  VITAMIN B12      2. Skin lesion of right ear  VITAMIN B12    Referral to Dermatology      3. Acquired hypothyroidism  levothyroxine (SYNTHROID) 50 MCG Tab    VITAMIN B12    refill synthroid.  chk TSH, T4 with other lab.  f/u for lab review      4. Thoracogenic scoliosis of thoracolumbar region  VITAMIN B12      5. Gastroesophageal reflux disease without esophagitis  VITAMIN B12      6. Family history of breast cancer in mother  VITAMIN B12    mother with breast cancer.  pt agrees to both mammo and WBUS for her dense breasts.      7. Trigeminal neuralgia of left side of face  VITAMIN B12      8. TMJ pain dysfunction syndrome  VITAMIN B12      9. Other proteinuria  VITAMIN B12    current lab is wnl.  resolved      10. Chronic allergic rhinitis due to pollen  VITAMIN B12      11. Dense breast  VITAMIN B12    US-SCREENING WHOLE BREAST BILATERAL (3D  SCREENING)      12. Amenorrhea  VITAMIN B12    FSH/LH    ESTRADIOL      13. Encounter for screening mammogram for malignant neoplasm of breast  VITAMIN B12    MA-SCREENING MAMMO BILAT W/TOMOSYNTHESIS W/CAD      14. Post menopausal problems  VITAMIN B12    DS-BONE DENSITY STUDY (DEXA)                  Please note that this dictation was created using voice recognition software. I have made every reasonable attempt to correct obvious errors, but I expect that there are errors of grammar and possibly content that I did not discover before finalizing the note.      Attestation      Verbal consent was acquired by the patient to use [employer] ambient listening note generation during this visit Yes                  [1]   Current Outpatient Medications   Medication Sig Dispense Refill    levothyroxine (SYNTHROID) 50 MCG Tab TAKE 2 TABLETS BY MOUTH ONCE WEEKLY AND TAKE 1 TABLET BY MOUTH DAILY ON ALL OTHER DAYS 30 Tablet 0    Multiple Vitamins-Minerals (MULTIVITAL) TABS Take 1 Tab by mouth every day.      Cholecalciferol (VITAMIN D) 1000 UNIT CAPS Take 2 Caps by mouth every day.       No current facility-administered medications for this visit.   [2]   Current Outpatient Medications   Medication Sig Dispense Refill    levothyroxine (SYNTHROID) 50 MCG Tab TAKE 2 TABLETS BY MOUTH ONCE WEEKLY AND TAKE 1 TABLET BY MOUTH DAILY ON ALL OTHER DAYS 30 Tablet 0    Multiple Vitamins-Minerals (MULTIVITAL) TABS Take 1 Tab by mouth every day.      Cholecalciferol (VITAMIN D) 1000 UNIT CAPS Take 2 Caps by mouth every day.       No current facility-administered medications for this visit.

## 2025-06-25 NOTE — Clinical Note
REFERRAL APPROVAL NOTICE         Sent on June 25, 2025                   Koki Arguello  [address]  [place] [address]                   Dear Ms. Arguello,    After a careful review of the medical information and benefit coverage, [facility] has processed your referral. See below for additional details.    If applicable, you must be actively enrolled with your insurance for coverage of the authorized service. If you have any questions regarding your coverage, please contact your insurance directly.    REFERRAL INFORMATION   Referral #:  [id number]  Referred-To Provider    Referred-By Provider:  Dermatology    RONALD Borden   [facility]      No address on file  Referring provider phone N/A [address]  [place] [address]  [phone]    Referral Start Date:  06/18/2025  Referral End Date:   06/18/2026             SCHEDULING  If you do not already have an appointment, please call [phone] to make an appointment.     MORE INFORMATION  If you do not already have a [employer] account, sign up at: [email or web address]  You can access your medical information, make appointments, see lab results, billing information, and more.  If you have questions regarding this referral, please contact  the [facility] Referrals department at:             [phone]. Monday - Friday 8:00AM - 5:00PM.     Sincerely,    [facility]

## 2025-06-29 ENCOUNTER — RESULTS FOLLOW-UP (OUTPATIENT)
Dept: MEDICAL GROUP | Facility: MEDICAL CENTER | Age: 50
End: 2025-06-29

## 2025-07-05 DIAGNOSIS — E03.9 ACQUIRED HYPOTHYROIDISM: ICD-10-CM

## 2025-07-05 RX ORDER — LEVOTHYROXINE SODIUM 50 UG/1
TABLET ORAL
Qty: 80 TABLET | Refills: 3 | Status: SHIPPED | OUTPATIENT
Start: 2025-07-05

## 2025-08-02 ENCOUNTER — HOSPITAL ENCOUNTER (OUTPATIENT)
Dept: LAB | Facility: MEDICAL CENTER | Age: 50
End: 2025-08-02
Attending: NURSE PRACTITIONER
Payer: COMMERCIAL

## 2025-08-02 DIAGNOSIS — M41.35 THORACOGENIC SCOLIOSIS OF THORACOLUMBAR REGION: ICD-10-CM

## 2025-08-02 DIAGNOSIS — H61.91 SKIN LESION OF RIGHT EAR: ICD-10-CM

## 2025-08-02 DIAGNOSIS — E03.9 ACQUIRED HYPOTHYROIDISM: ICD-10-CM

## 2025-08-02 DIAGNOSIS — R92.30 DENSE BREAST: ICD-10-CM

## 2025-08-02 DIAGNOSIS — Z13.220 SCREENING FOR HYPERLIPIDEMIA: ICD-10-CM

## 2025-08-02 DIAGNOSIS — M26.629 TMJ PAIN DYSFUNCTION SYNDROME: ICD-10-CM

## 2025-08-02 DIAGNOSIS — I72.0 CAROTID ANEURYSM, LEFT (HCC): ICD-10-CM

## 2025-08-02 DIAGNOSIS — N91.2 AMENORRHEA: ICD-10-CM

## 2025-08-02 DIAGNOSIS — Z12.31 ENCOUNTER FOR SCREENING MAMMOGRAM FOR MALIGNANT NEOPLASM OF BREAST: ICD-10-CM

## 2025-08-02 DIAGNOSIS — R80.8 OTHER PROTEINURIA: ICD-10-CM

## 2025-08-02 DIAGNOSIS — N95.9 POST MENOPAUSAL PROBLEMS: ICD-10-CM

## 2025-08-02 DIAGNOSIS — Z80.3 FAMILY HISTORY OF BREAST CANCER IN MOTHER: Chronic | ICD-10-CM

## 2025-08-02 DIAGNOSIS — G50.0 TRIGEMINAL NEURALGIA OF LEFT SIDE OF FACE: ICD-10-CM

## 2025-08-02 DIAGNOSIS — J30.1 CHRONIC ALLERGIC RHINITIS DUE TO POLLEN: ICD-10-CM

## 2025-08-02 DIAGNOSIS — K21.9 GASTROESOPHAGEAL REFLUX DISEASE WITHOUT ESOPHAGITIS: ICD-10-CM

## 2025-08-02 LAB
CHOLEST SERPL-MCNC: 179 MG/DL (ref 100–199)
ESTRADIOL SERPL-MCNC: 276 PG/ML
FSH SERPL-ACNC: 11.2 MIU/ML
HDLC SERPL-MCNC: 94 MG/DL
LDLC SERPL CALC-MCNC: 66 MG/DL
LH SERPL-ACNC: 18.8 IU/L
TRIGL SERPL-MCNC: 95 MG/DL (ref 0–149)
VIT B12 SERPL-MCNC: 955 PG/ML (ref 211–911)

## 2025-08-02 PROCEDURE — 83002 ASSAY OF GONADOTROPIN (LH): CPT

## 2025-08-02 PROCEDURE — 82607 VITAMIN B-12: CPT

## 2025-08-02 PROCEDURE — 36415 COLL VENOUS BLD VENIPUNCTURE: CPT

## 2025-08-02 PROCEDURE — 82670 ASSAY OF TOTAL ESTRADIOL: CPT

## 2025-08-02 PROCEDURE — 83001 ASSAY OF GONADOTROPIN (FSH): CPT

## 2025-08-02 PROCEDURE — 80061 LIPID PANEL: CPT

## 2025-08-05 ENCOUNTER — HOSPITAL ENCOUNTER (OUTPATIENT)
Dept: RADIOLOGY | Facility: MEDICAL CENTER | Age: 50
End: 2025-08-05
Attending: NURSE PRACTITIONER
Payer: COMMERCIAL

## 2025-08-05 VITALS — WEIGHT: 142 LBS | BODY MASS INDEX: 26.13 KG/M2 | HEIGHT: 62 IN

## 2025-08-05 DIAGNOSIS — Z12.31 ENCOUNTER FOR SCREENING MAMMOGRAM FOR MALIGNANT NEOPLASM OF BREAST: ICD-10-CM

## 2025-08-05 PROCEDURE — 77067 SCR MAMMO BI INCL CAD: CPT

## 2025-08-05 ASSESSMENT — FIBROSIS 4 INDEX: FIB4 SCORE: 1.38

## 2025-08-07 ENCOUNTER — TELEPHONE (OUTPATIENT)
Dept: NEUROLOGY | Facility: MEDICAL CENTER | Age: 50
End: 2025-08-07
Payer: COMMERCIAL

## 2025-08-28 ENCOUNTER — APPOINTMENT (OUTPATIENT)
Dept: NEUROLOGY | Facility: MEDICAL CENTER | Age: 50
End: 2025-08-28
Payer: COMMERCIAL